# Patient Record
Sex: MALE | Race: BLACK OR AFRICAN AMERICAN | Employment: UNEMPLOYED | URBAN - METROPOLITAN AREA
[De-identification: names, ages, dates, MRNs, and addresses within clinical notes are randomized per-mention and may not be internally consistent; named-entity substitution may affect disease eponyms.]

---

## 2019-08-27 ENCOUNTER — HOSPITAL ENCOUNTER (OUTPATIENT)
Dept: LAB | Age: 59
Discharge: HOME | End: 2019-08-27
Payer: MEDICARE

## 2019-08-27 DIAGNOSIS — Z79.899 ENCOUNTER FOR LONG-TERM (CURRENT) USE OF OTHER MEDICATIONS: ICD-10-CM

## 2019-08-27 DIAGNOSIS — K30 DYSPEPSIA DUE TO DYSMOTILITY: ICD-10-CM

## 2019-08-27 DIAGNOSIS — R63.0 ANOREXIA: ICD-10-CM

## 2019-08-27 LAB
ALBUMIN SERPL-MCNC: 3.5 G/DL (ref 3.4–5)
ALBUMIN/GLOB SERPL: 0.9 {RATIO} (ref 0.8–1.7)
ALP SERPL-CCNC: 60 U/L (ref 45–117)
ALT SERPL-CCNC: 36 U/L (ref 16–61)
ANION GAP SERPL CALC-SCNC: 6 MMOL/L (ref 3–18)
AST SERPL-CCNC: 21 U/L (ref 10–38)
BILIRUB SERPL-MCNC: 0.5 MG/DL (ref 0.2–1)
BUN SERPL-MCNC: 11 MG/DL (ref 7–18)
BUN/CREAT SERPL: 11 (ref 12–20)
CALCIUM SERPL-MCNC: 8.8 MG/DL (ref 8.5–10.1)
CHLORIDE SERPL-SCNC: 107 MMOL/L (ref 100–111)
CO2 SERPL-SCNC: 29 MMOL/L (ref 21–32)
CREAT SERPL-MCNC: 1.01 MG/DL (ref 0.6–1.3)
ERYTHROCYTE [DISTWIDTH] IN BLOOD BY AUTOMATED COUNT: 13.8 % (ref 11.6–14.5)
GLOBULIN SER CALC-MCNC: 3.8 G/DL (ref 2–4)
GLUCOSE SERPL-MCNC: 83 MG/DL (ref 74–99)
HCT VFR BLD AUTO: 40.1 % (ref 36–48)
HGB BLD-MCNC: 14.4 G/DL (ref 13–16)
MCH RBC QN AUTO: 34.4 PG (ref 24–34)
MCHC RBC AUTO-ENTMCNC: 35.9 G/DL (ref 31–37)
MCV RBC AUTO: 95.7 FL (ref 74–97)
PLATELET # BLD AUTO: 263 K/UL (ref 135–420)
PMV BLD AUTO: 10.8 FL (ref 9.2–11.8)
POTASSIUM SERPL-SCNC: 4.4 MMOL/L (ref 3.5–5.5)
PROT SERPL-MCNC: 7.3 G/DL (ref 6.4–8.2)
RBC # BLD AUTO: 4.19 M/UL (ref 4.7–5.5)
SODIUM SERPL-SCNC: 142 MMOL/L (ref 136–145)
WBC # BLD AUTO: 6.1 K/UL (ref 4.6–13.2)

## 2019-08-27 PROCEDURE — 80053 COMPREHEN METABOLIC PANEL: CPT

## 2019-08-27 PROCEDURE — 85027 COMPLETE CBC AUTOMATED: CPT

## 2019-08-27 PROCEDURE — 36415 COLL VENOUS BLD VENIPUNCTURE: CPT

## 2020-03-04 ENCOUNTER — HOSPITAL ENCOUNTER (OUTPATIENT)
Dept: GENERAL RADIOLOGY | Age: 60
Discharge: HOME | End: 2020-03-04
Payer: MEDICARE

## 2020-03-04 DIAGNOSIS — Z11.1 SCREENING EXAMINATION FOR PULMONARY TUBERCULOSIS: ICD-10-CM

## 2020-03-04 PROCEDURE — 71046 X-RAY EXAM CHEST 2 VIEWS: CPT

## 2021-03-29 ENCOUNTER — HOSPITAL ENCOUNTER (EMERGENCY)
Age: 61
Discharge: HOME | End: 2021-03-29
Attending: EMERGENCY MEDICINE
Payer: MEDICARE

## 2021-03-29 VITALS
RESPIRATION RATE: 16 BRPM | HEART RATE: 108 BPM | TEMPERATURE: 98.2 F | SYSTOLIC BLOOD PRESSURE: 150 MMHG | OXYGEN SATURATION: 98 % | DIASTOLIC BLOOD PRESSURE: 105 MMHG

## 2021-03-29 DIAGNOSIS — R11.2 NON-INTRACTABLE VOMITING WITH NAUSEA, UNSPECIFIED VOMITING TYPE: Primary | ICD-10-CM

## 2021-03-29 DIAGNOSIS — R19.7 DIARRHEA, UNSPECIFIED TYPE: ICD-10-CM

## 2021-03-29 LAB
BASOPHILS # BLD: 0 K/UL (ref 0–0.1)
BASOPHILS NFR BLD: 0 % (ref 0–2)
DIFFERENTIAL METHOD BLD: ABNORMAL
EOSINOPHIL # BLD: 0.2 K/UL (ref 0–0.4)
EOSINOPHIL NFR BLD: 1 % (ref 0–5)
ERYTHROCYTE [DISTWIDTH] IN BLOOD BY AUTOMATED COUNT: 13.7 % (ref 11.6–14.5)
HCT VFR BLD AUTO: 41.4 % (ref 36–48)
HGB BLD-MCNC: 15.2 G/DL (ref 13–16)
LYMPHOCYTES # BLD: 2.2 K/UL (ref 0.9–3.6)
LYMPHOCYTES NFR BLD: 20 % (ref 21–52)
MCH RBC QN AUTO: 34.1 PG (ref 24–34)
MCHC RBC AUTO-ENTMCNC: 36.7 G/DL (ref 31–37)
MCV RBC AUTO: 92.8 FL (ref 74–97)
MONOCYTES # BLD: 0.9 K/UL (ref 0.05–1.2)
MONOCYTES NFR BLD: 8 % (ref 3–10)
NEUTS SEG # BLD: 7.7 K/UL (ref 1.8–8)
NEUTS SEG NFR BLD: 71 % (ref 40–73)
PLATELET # BLD AUTO: 413 K/UL (ref 135–420)
PMV BLD AUTO: 10.5 FL (ref 9.2–11.8)
RBC # BLD AUTO: 4.46 M/UL (ref 4.7–5.5)
WBC # BLD AUTO: 11 K/UL (ref 4.6–13.2)

## 2021-03-29 PROCEDURE — 85025 COMPLETE CBC W/AUTO DIFF WBC: CPT

## 2021-03-29 PROCEDURE — 99282 EMERGENCY DEPT VISIT SF MDM: CPT

## 2021-03-29 RX ORDER — ONDANSETRON 4 MG/1
4 TABLET, ORALLY DISINTEGRATING ORAL
Status: DISCONTINUED | OUTPATIENT
Start: 2021-03-29 | End: 2021-03-30 | Stop reason: HOSPADM

## 2021-03-29 RX ORDER — ONDANSETRON 4 MG/1
4 TABLET, ORALLY DISINTEGRATING ORAL
Qty: 20 TAB | Refills: 0 | Status: SHIPPED | OUTPATIENT
Start: 2021-03-29

## 2021-03-29 RX ORDER — ONDANSETRON 2 MG/ML
4 INJECTION INTRAMUSCULAR; INTRAVENOUS
Status: DISCONTINUED | OUTPATIENT
Start: 2021-03-29 | End: 2021-03-29

## 2021-03-29 NOTE — DISCHARGE INSTRUCTIONS
[employer] Activation    Thank you for requesting access to [employer] Please follow the instructions below to securely access and download your online medical record. [employer] allows you to send messages to your doctor, view your test results, renew your prescriptions, schedule appointments, and more. How Do I Sign Up? In your internet browser, go to [email or web address]  Click on the First Time User? Click Here link in the Sign In box. You will be redirect to the New Member Sign Up page. Enter your [employer] Access Code exactly as it appears below. You will not need to use this code after youve completed the sign-up process. If you do not sign up before the expiration date, you must request a new code. [employer] Access Code: [unfilled] (This is the date your [employer] access code will )    Enter the last four digits of your Social Security Number (xxxx) and Date of Birth (mm/dd/yyyy) as indicated and click Submit. You will be taken to the next sign-up page. Create a [employer] ID. This will be your [employer] login ID and cannot be changed, so think of one that is secure and easy to remember. Create a [employer] password. You can change your password at any time. Enter your Password Reset Question and Answer. This can be used at a later time if you forget your password. Enter your e-mail address. You will receive e-mail notification when new information is available in [address] Click Sign Up. You can now view and download portions of your medical record. Click the [place] link to download a portable copy of your medical information. Additional Information    If you have questions, please visit the Frequently Asked Questions section of the [employer] website at [email or web address] Remember, [employer] is NOT to be used for urgent needs. For medical emergencies, dial 911.

## 2021-03-29 NOTE — ED PROVIDER NOTES
EMERGENCY DEPARTMENT HISTORY AND PHYSICAL EXAM    Date: 3/29/2021  Patient Name: Raad Schneider III    History of Presenting Illness     Chief Complaint   Patient presents with    Other     dialysis          History Provided By: Patient    Chief Complaint: Nausea vomiting and diarrhea, loss of appetite  Duration: 5 days  Timing: Improved  Location: Abdomen  Quality: Cramping  Severity: Moderate  Modifying Factors: None  Associated Symptoms: none       Additional History (Context): Raad Schneider III is a 62 y.o. male with a history of schizophrenia who presents today for history as listed above. Patient is coming from [address] [facility] and his caregiver Turner Sigala ([phone]) provided the majority of the history. Per Ms. Turner Sigala last Thursday patient started having nausea vomiting and diarrhea. They were giving him Imodium at home and states this is helped however she was concerned that he will not eat a full meal.  States he has been eating and drinking some but not like normal.  Denies any exposure to Covid. States he has had his Covid vaccine. Patient and Ms. Turner Sigala denies any history of dialysis or hypertension. States he has been compliant with his lithium. PCP: Leno Williamson MD    Current Facility-Administered Medications   Medication Dose Route Frequency Provider Last Rate Last Admin    ondansetron [facility]) injection 4 mg  4 mg IntraVENous NOW Nohemi Gibson PA        sodium chloride 0.9 % bolus infusion 1,000 mL  1,000 mL IntraVENous ONCE Jasson Gibson [place]         Current Outpatient Medications   Medication Sig Dispense Refill    lithium carbonate (LITHOBID) 150 mg capsule Take  by mouth three (3) times daily. Past History     Past Medical History:  Past Medical History:   Diagnosis Date    Psychiatric disorder     Schizophrenia        Past Surgical History:  No past surgical history on file. Family History:  No family history on file.     Social History:  Social History     Tobacco Use    Smoking status: Current Every Day Smoker     Packs/day: 0.50   Substance Use Topics    Alcohol use: Yes     Comment: Daily     Drug use: Not on file       Allergies: Allergies   Allergen Reactions    Other Food Swelling     Peanuts    Pcn [Penicillins] Unknown (comments)         Review of Systems   Review of Systems   Constitutional: Negative for chills and fever. HENT: Negative for congestion, rhinorrhea and sore throat. Respiratory: Negative for cough and shortness of breath. Cardiovascular: Negative for chest pain. Gastrointestinal: Positive for abdominal pain, diarrhea, nausea and vomiting. Negative for blood in stool and constipation. Genitourinary: Negative for dysuria, frequency and hematuria. Musculoskeletal: Negative for back pain and myalgias. Skin: Negative for rash and wound. Neurological: Negative for dizziness and headaches. All other systems reviewed and are negative. All Other Systems Negative  Physical Exam     Vitals:    03/29/21 1632   BP: (!) 203/54   Pulse: (!) 45   Resp: 20   Temp: 98.1 °F (36.7 °C)   SpO2: 100%   Height: 5' 4\" (1.626 m)     Physical Exam  Vitals signs and nursing note reviewed. Constitutional:       General: He is not in acute distress. Appearance: He is well-developed. He is not diaphoretic. HENT:      Head: Normocephalic and atraumatic. Eyes:      Conjunctiva/sclera: Conjunctivae normal.   Neck:      Musculoskeletal: Normal range of motion and neck supple. Cardiovascular:      Rate and Rhythm: Normal rate and regular rhythm. Heart sounds: Normal heart sounds. Pulmonary:      Effort: Pulmonary effort is normal. No respiratory distress. Breath sounds: Normal breath sounds. Chest:      Chest wall: No tenderness. Abdominal:      General: Bowel sounds are normal. There is no distension. Palpations: Abdomen is soft. Tenderness: There is no abdominal tenderness.  There is no guarding or rebound. Comments: Soft and non tender    Musculoskeletal: Normal range of motion. General: No deformity. Skin:     General: Skin is warm and dry. Neurological:      Mental Status: He is alert and oriented to person, place, and time. Diagnostic Study Results     Labs -   No results found for this or any previous visit (from the past 12 hour(s)). Radiologic Studies -   No orders to display     CT Results  (Last 48 hours)    None        CXR Results  (Last 48 hours)    None            Medical Decision Making   I am the first provider for this patient. I reviewed the vital signs, available nursing notes, past medical history, past surgical history, family history and social history. Vital Signs-Reviewed the patient's vital signs. Records Reviewed: Nursing Notes and Old Medical Records     Procedures: None   Procedures    Provider Notes (Medical Decision Making):     Differential: Gastroenteritis, colitis, Covid, IBS/IBD      Plan: Order basic labs, fluids and Zofran. We will also test for Covid. 5:55 PM : Pt care transferred to April PA,ED provider. History of patient complaint(s), available diagnostic reports and current treatment plan has been discussed thoroughly. Bedside rounding on patient occured : no . Intended disposition of patient : Home   Pending diagnostics reports and/or labs (please list): Labs and fluids        MED RECONCILIATION:  Current Facility-Administered Medications   Medication Dose Route Frequency    ondansetron (ZOFRAN) injection 4 mg  4 mg IntraVENous NOW    sodium chloride 0.9 % bolus infusion 1,000 mL  1,000 mL IntraVENous ONCE     Current Outpatient Medications   Medication Sig    lithium carbonate (LITHOBID) 150 mg capsule Take  by mouth three (3) times daily. Disposition:  TBD    Diagnosis     Clinical Impression: No diagnosis found.       \"Please note that this dictation was completed with [employer], the [employer] voice recognition software. Quite often unanticipated grammatical, syntax, homophones, and other interpretive errors are inadvertently transcribed by the computer software. Please disregard these errors. Please excuse any errors that have escaped final proofreading. \"

## 2021-03-29 NOTE — ED NOTES
6:00 PM Assumed care of the pt at this time. Discussed with PILY Sanford concerning patient Pippa Marin III, standard discussion of reason for visit, HPI, ROS, PE, and current results available. Recommendation for obtaining pending labs and bedside re-evaluation. Miley Bravo PA-C     6:46 PM CBC essentially unremarkable. Remaining pending labs hemolyzed. Miley Bravo PA-C      7:27 PM I have seen and re-evaluated the pt at bedside. Hemolyzed labs redrawn and in process. Pt is requesting Maalox and to be discharged home. ODT zofran and maalox ordered. He has no pain or abd TTP noted on exam. Initial vitals and triage note were removed from the chart as the nurse placed them in the wrong chart. Repeat vitals stable. Will plan to d/c provided that the pending labs are unremarkable. Miley Bravo PA-C     7:48 PM recollect hemolyzed again. Will straight stick the pt. Miley Bravo PA-C     7:51 PM pt refused recollect. He has no pain at present. Vitals stable. No concerns for sepsis or acute abdomen at this time. Will plan to kinder discharge the pt with close PCP follow-up this week. Return precautions given. Pt agrees . Miley Bravo PA-C       Disposition: d/c home

## 2022-09-08 ENCOUNTER — HOSPITAL ENCOUNTER (OUTPATIENT)
Dept: RADIATION THERAPY | Age: 62
Discharge: HOME | End: 2022-09-08
Payer: MEDICARE

## 2022-09-08 PROCEDURE — 99211 OFF/OP EST MAY X REQ PHY/QHP: CPT

## 2022-09-08 PROCEDURE — 99205 OFFICE O/P NEW HI 60 MIN: CPT | Performed by: RADIOLOGY

## 2022-12-20 ENCOUNTER — APPOINTMENT (OUTPATIENT)
Dept: CT IMAGING | Age: 62
DRG: 390 | End: 2022-12-20
Attending: EMERGENCY MEDICINE
Payer: MEDICARE

## 2022-12-20 ENCOUNTER — HOSPITAL ENCOUNTER (INPATIENT)
Age: 62
LOS: 5 days | Discharge: HOME | DRG: 390 | End: 2022-12-25
Attending: STUDENT IN AN ORGANIZED HEALTH CARE EDUCATION/TRAINING PROGRAM | Admitting: HOSPITALIST
Payer: MEDICARE

## 2022-12-20 DIAGNOSIS — E87.1 HYPONATREMIA: ICD-10-CM

## 2022-12-20 DIAGNOSIS — R74.8 ELEVATED LIPASE: ICD-10-CM

## 2022-12-20 DIAGNOSIS — K56.609 SMALL BOWEL OBSTRUCTION (HCC): ICD-10-CM

## 2022-12-20 DIAGNOSIS — R10.32 ABDOMINAL PAIN, LLQ (LEFT LOWER QUADRANT): Primary | ICD-10-CM

## 2022-12-20 DIAGNOSIS — F20.9 SCHIZOPHRENIA, UNSPECIFIED TYPE (HCC): ICD-10-CM

## 2022-12-20 LAB
ALBUMIN SERPL-MCNC: 3.5 G/DL (ref 3.4–5)
ALBUMIN/GLOB SERPL: 0.7 {RATIO} (ref 0.8–1.7)
ALP SERPL-CCNC: 72 U/L (ref 45–117)
ALT SERPL-CCNC: 23 U/L (ref 16–61)
ANION GAP SERPL CALC-SCNC: 6 MMOL/L (ref 3–18)
APPEARANCE UR: CLEAR
AST SERPL-CCNC: 9 U/L (ref 10–38)
BASOPHILS # BLD: 0 K/UL (ref 0–0.1)
BASOPHILS NFR BLD: 0 % (ref 0–2)
BILIRUB SERPL-MCNC: 0.8 MG/DL (ref 0.2–1)
BILIRUB UR QL: NEGATIVE
BUN SERPL-MCNC: 12 MG/DL (ref 7–18)
BUN/CREAT SERPL: 9 (ref 12–20)
CALCIUM SERPL-MCNC: 9.2 MG/DL (ref 8.5–10.1)
CHLORIDE SERPL-SCNC: 93 MMOL/L (ref 100–111)
CO2 SERPL-SCNC: 31 MMOL/L (ref 21–32)
COLOR UR: ABNORMAL
CREAT SERPL-MCNC: 1.27 MG/DL (ref 0.6–1.3)
DIFFERENTIAL METHOD BLD: ABNORMAL
EOSINOPHIL # BLD: 0.1 K/UL (ref 0–0.4)
EOSINOPHIL NFR BLD: 1 % (ref 0–5)
ERYTHROCYTE [DISTWIDTH] IN BLOOD BY AUTOMATED COUNT: 13.6 % (ref 11.6–14.5)
GLOBULIN SER CALC-MCNC: 5.1 G/DL (ref 2–4)
GLUCOSE SERPL-MCNC: 111 MG/DL (ref 74–99)
GLUCOSE UR STRIP.AUTO-MCNC: NEGATIVE MG/DL
HCT VFR BLD AUTO: 42.9 % (ref 36–48)
HGB BLD-MCNC: 15 G/DL (ref 13–16)
HGB UR QL STRIP: NEGATIVE
IMM GRANULOCYTES # BLD AUTO: 0 K/UL (ref 0–0.04)
IMM GRANULOCYTES NFR BLD AUTO: 0 % (ref 0–0.5)
KETONES UR QL STRIP.AUTO: ABNORMAL MG/DL
LEUKOCYTE ESTERASE UR QL STRIP.AUTO: NEGATIVE
LIPASE SERPL-CCNC: 51 U/L (ref 73–393)
LYMPHOCYTES # BLD: 1.8 K/UL (ref 0.9–3.6)
LYMPHOCYTES NFR BLD: 19 % (ref 21–52)
MCH RBC QN AUTO: 33 PG (ref 24–34)
MCHC RBC AUTO-ENTMCNC: 35 G/DL (ref 31–37)
MCV RBC AUTO: 94.3 FL (ref 78–100)
MONOCYTES # BLD: 1.1 K/UL (ref 0.05–1.2)
MONOCYTES NFR BLD: 12 % (ref 3–10)
NEUTS SEG # BLD: 6 K/UL (ref 1.8–8)
NEUTS SEG NFR BLD: 67 % (ref 40–73)
NITRITE UR QL STRIP.AUTO: NEGATIVE
NRBC # BLD: 0 K/UL (ref 0–0.01)
NRBC BLD-RTO: 0 PER 100 WBC
PH UR STRIP: 6.5 [PH] (ref 5–8)
PLATELET # BLD AUTO: 296 K/UL (ref 135–420)
PMV BLD AUTO: 10.6 FL (ref 9.2–11.8)
POTASSIUM SERPL-SCNC: 4.2 MMOL/L (ref 3.5–5.5)
PROT SERPL-MCNC: 8.6 G/DL (ref 6.4–8.2)
PROT UR STRIP-MCNC: NEGATIVE MG/DL
RBC # BLD AUTO: 4.55 M/UL (ref 4.35–5.65)
SODIUM SERPL-SCNC: 130 MMOL/L (ref 136–145)
SP GR UR REFRACTOMETRY: 1.01 (ref 1–1.03)
UROBILINOGEN UR QL STRIP.AUTO: 1 EU/DL (ref 0.2–1)
WBC # BLD AUTO: 9 K/UL (ref 4.6–13.2)

## 2022-12-20 PROCEDURE — 0D9670Z DRAINAGE OF STOMACH WITH DRAINAGE DEVICE, VIA NATURAL OR ARTIFICIAL OPENING: ICD-10-PCS | Performed by: GENERAL PRACTICE

## 2022-12-20 PROCEDURE — 74011000636 HC RX REV CODE- 636: Performed by: STUDENT IN AN ORGANIZED HEALTH CARE EDUCATION/TRAINING PROGRAM

## 2022-12-20 PROCEDURE — 65270000029 HC RM PRIVATE

## 2022-12-20 PROCEDURE — 96374 THER/PROPH/DIAG INJ IV PUSH: CPT

## 2022-12-20 PROCEDURE — 81003 URINALYSIS AUTO W/O SCOPE: CPT

## 2022-12-20 PROCEDURE — 83690 ASSAY OF LIPASE: CPT

## 2022-12-20 PROCEDURE — 99285 EMERGENCY DEPT VISIT HI MDM: CPT

## 2022-12-20 PROCEDURE — 80053 COMPREHEN METABOLIC PANEL: CPT

## 2022-12-20 PROCEDURE — 74011250636 HC RX REV CODE- 250/636: Performed by: GENERAL PRACTICE

## 2022-12-20 PROCEDURE — 85025 COMPLETE CBC W/AUTO DIFF WBC: CPT

## 2022-12-20 PROCEDURE — 74177 CT ABD & PELVIS W/CONTRAST: CPT

## 2022-12-20 RX ORDER — MORPHINE SULFATE 4 MG/ML
4 INJECTION INTRAVENOUS ONCE
Status: COMPLETED | OUTPATIENT
Start: 2022-12-20 | End: 2022-12-20

## 2022-12-20 RX ADMIN — MORPHINE SULFATE 4 MG: 4 INJECTION, SOLUTION INTRAMUSCULAR; INTRAVENOUS at 22:24

## 2022-12-20 RX ADMIN — IOPAMIDOL 100 ML: 612 INJECTION, SOLUTION INTRAVENOUS at 19:19

## 2022-12-20 RX ADMIN — SODIUM CHLORIDE 1000 ML: 9 INJECTION, SOLUTION INTRAVENOUS at 22:26

## 2022-12-20 NOTE — ED PROVIDER NOTES
[facility]  Emergency Department Treatment Report    Patient: Lincoln Taylor III Age: 65 y.o. Sex: male    YOB: 1960 Admit Date: 12/20/2022 PCP: Solomon Painter MD   MRN: [id number]  CSN: [id number]     Room: [facility] Time Dictated: 4:07 PM      Chief Complaint   Chief Complaint   Patient presents with    Abdominal Pain     History of Present Illness   65 y.o. male who presents with concerns for abdominal pain in the left lower quadrant and has been going on for 3 days. He denies any fevers or chills or shortness of breath or chest pain. Denies any diarrhea. Denies any difficulty with urination or burning or pain. States he has never had any surgery of his belly. He denies any recent weight loss. Per nursing report:  Patient lives in assisted housing, they called mother to bring him in for abdominal pain that has been going on for 3 days. Patient denies N/V, tender during palpation per patient. Endorses diarrhea     Review of Systems   Review of Systems   Constitutional:  Negative for chills, diaphoresis, fever and malaise/fatigue. HENT:  Negative for congestion, sore throat and tinnitus. Eyes:  Negative for redness. Respiratory:  Negative for cough, hemoptysis, shortness of breath and wheezing. Cardiovascular:  Negative for chest pain, palpitations and leg swelling. Gastrointestinal:  Positive for abdominal pain and diarrhea. Negative for blood in stool, nausea and vomiting. Genitourinary:  Negative for dysuria. Musculoskeletal:  Negative for falls. Skin:  Negative for rash. Neurological:  Negative for dizziness, focal weakness, weakness and headaches.      Past Medical/Surgical History     Past Medical History:   Diagnosis Date    Psychiatric disorder     Schizophrenia      Past Surgical History:   Procedure Laterality Date    HX UROLOGICAL  06/02/2022     TRANSRECTAL ULTRASOUND GUIDED BIOPSY OF THE PROSTATE; Dr. Julio Gutiérrez History     Socioeconomic History    Marital status: SINGLE     Spouse name: Not on file    Number of children: Not on file    Years of education: Not on file    Highest education level: Not on file   Occupational History    Not on file   Tobacco Use    Smoking status: Every Day     Packs/day: 0.50     Types: Cigarettes    Smokeless tobacco: Never   Substance and Sexual Activity    Alcohol use: Yes     Comment: Daily     Drug use: Not on file    Sexual activity: Not on file   Other Topics Concern    Not on file   Social History Narrative    Not on file     Social Determinants of Health     Financial Resource Strain: Not on file   Food Insecurity: Not on file   Transportation Needs: Not on file   Physical Activity: Not on file   Stress: Not on file   Social Connections: Not on file   Intimate Partner Violence: Not on file   Housing Stability: Not on file     Family History   No family history on file. Current Medications     Prior to Admission Medications   Prescriptions Last Dose Informant Patient Reported? Taking? OLANZapine (ZyPREXA) 20 mg tablet   Yes No   Sig: Take 20 mg by mouth nightly. QUEtiapine (SEROquel) 100 mg tablet   Yes No   Therems-M 9 mg iron-400 mcg tab tablet   Yes No   cyproheptadine (PERIACTIN) 4 mg tablet   Yes No   dicyclomine (BENTYL) 10 mg capsule   Yes No   divalproex DR (DEPAKOTE) 500 mg tablet   Yes No   docusate sodium (COLACE) 100 mg capsule   Yes No   Sig: Take 100 mg by mouth two (2) times a day.   hydrOXYzine HCL (ATARAX) 50 mg tablet   Yes No   Sig: Take 50 mg by mouth three (3) times daily as needed. levoFLOXacin (LEVAQUIN) 750 mg tablet   No No   Sig: Take 1 Tablet by mouth daily. Take morning of the biopsy   lithium carbonate (LITHOBID) 150 mg capsule   Yes No   Sig: Take  by mouth three (3) times daily. omeprazole (PRILOSEC) 40 mg capsule   Yes No   Sig: Take 40 mg by mouth daily.    ondansetron (Zofran ODT) 4 mg disintegrating tablet   No No   Si Tab by SubLINGual route every eight (8) hours as needed for Nausea or Vomiting.   tamsulosin (FLOMAX) 0.4 mg capsule   No No   Sig: Take 1 Capsule by mouth daily (after dinner). terbinafine HCL (LAMISIL) 1 % topical cream   Yes No   Sig: Apply  to affected area two (2) times a day. traZODone (DESYREL) 150 mg tablet   Yes No   Sig: Take 150 mg by mouth nightly. Facility-Administered Medications: None     Allergies     Allergies   Allergen Reactions    Other Food Swelling     Peanuts    Peanuts [Peanut] Anaphylaxis    Pcn [Penicillins] Unknown (comments)     Physical Exam     ED Triage Vitals [12/20/22 1559]   ED Encounter Vitals Group      BP (!) 150/122      Pulse (Heart Rate) (!) 120      Resp Rate 16      Temp 97.9 °F (36.6 °C)      Temp src       O2 Sat (%) 98 %      Weight       Height       Physical Exam  Vitals and nursing note reviewed. Constitutional:       General: He is not in acute distress. Appearance: He is normal weight. He is not ill-appearing, toxic-appearing or diaphoretic. HENT:      Head: Normocephalic and atraumatic. Mouth/Throat:      Mouth: Mucous membranes are moist.      Pharynx: Oropharynx is clear. Eyes:      General: No visual field deficit. Extraocular Movements: Extraocular movements intact. Right eye: Normal extraocular motion and no nystagmus. Left eye: Normal extraocular motion and no nystagmus. Pupils: Pupils are equal, round, and reactive to light. Cardiovascular:      Rate and Rhythm: Normal rate and regular rhythm. Heart sounds: No murmur heard. No friction rub. No gallop. Pulmonary:      Effort: Pulmonary effort is normal. No respiratory distress. Breath sounds: Normal breath sounds. No stridor. Abdominal:      General: Bowel sounds are normal.      Palpations: Abdomen is soft. Tenderness: There is abdominal tenderness in the left lower quadrant. There is no right CVA tenderness, left CVA tenderness or guarding.       Hernia: No hernia is present. Musculoskeletal:      Cervical back: Normal range of motion and neck supple. Skin:     General: Skin is warm and dry. Capillary Refill: Capillary refill takes less than 2 seconds. Coloration: Skin is not pale. Neurological:      Mental Status: He is alert and oriented to person, place, and time. GCS: GCS eye subscore is 4. GCS verbal subscore is 5. GCS motor subscore is 6. Cranial Nerves: No cranial nerve deficit, dysarthria or facial asymmetry. Sensory: No sensory deficit. Motor: No weakness. Psychiatric:         Speech: Speech normal.      Diagnostic Studies   Lab:   Recent Results (from the past 12 hour(s))   URINALYSIS W/ RFLX MICROSCOPIC    Collection Time: 12/20/22  4:01 PM   Result Value Ref Range    Color DARK YELLOW      Appearance CLEAR      Specific gravity 1.013 1.005 - 1.030      pH (UA) 6.5 5.0 - 8.0      Protein Negative NEG mg/dL    Glucose Negative NEG mg/dL    Ketone TRACE (A) NEG mg/dL    Bilirubin Negative NEG      Blood Negative NEG      Urobilinogen 1.0 0.2 - 1.0 EU/dL    Nitrites Negative NEG      Leukocyte Esterase Negative NEG     CBC WITH AUTOMATED DIFF    Collection Time: 12/20/22  4:45 PM   Result Value Ref Range    WBC 9.0 4.6 - 13.2 K/uL    RBC 4.55 4.35 - 5.65 M/uL    HGB 15.0 13.0 - 16.0 g/dL    HCT 42.9 36.0 - 48.0 %    MCV 94.3 78.0 - 100.0 FL    MCH 33.0 24.0 - 34.0 PG    MCHC 35.0 31.0 - 37.0 g/dL    RDW 13.6 11.6 - 14.5 %    PLATELET 093 799 - 982 K/uL    MPV 10.6 9.2 - 11.8 FL    NRBC 0.0 0  WBC    ABSOLUTE NRBC 0.00 0.00 - 0.01 K/uL    NEUTROPHILS 67 40 - 73 %    LYMPHOCYTES 19 (L) 21 - 52 %    MONOCYTES 12 (H) 3 - 10 %    EOSINOPHILS 1 0 - 5 %    BASOPHILS 0 0 - 2 %    IMMATURE GRANULOCYTES 0 0.0 - 0.5 %    ABS. NEUTROPHILS 6.0 1.8 - 8.0 K/UL    ABS. LYMPHOCYTES 1.8 0.9 - 3.6 K/UL    ABS. MONOCYTES 1.1 0.05 - 1.2 K/UL    ABS. EOSINOPHILS 0.1 0.0 - 0.4 K/UL    ABS. BASOPHILS 0.0 0.0 - 0.1 K/UL    ABS. IMM. GRANS. 0.0 0.00 - 0.04 K/UL    DF AUTOMATED     LIPASE    Collection Time: 12/20/22  4:45 PM   Result Value Ref Range    Lipase 51 (L) 73 - 954 U/L   METABOLIC PANEL, COMPREHENSIVE    Collection Time: 12/20/22  4:45 PM   Result Value Ref Range    Sodium 130 (L) 136 - 145 mmol/L    Potassium 4.2 3.5 - 5.5 mmol/L    Chloride 93 (L) 100 - 111 mmol/L    CO2 31 21 - 32 mmol/L    Anion gap 6 3.0 - 18 mmol/L    Glucose 111 (H) 74 - 99 mg/dL    BUN 12 7.0 - 18 MG/DL    Creatinine 1.27 0.6 - 1.3 MG/DL    BUN/Creatinine ratio 9 (L) 12 - 20      eGFR >60 >60 ml/min/1.73m2    Calcium 9.2 8.5 - 10.1 MG/DL    Bilirubin, total 0.8 0.2 - 1.0 MG/DL    ALT (SGPT) 23 16 - 61 U/L    AST (SGOT) 9 (L) 10 - 38 U/L    Alk. phosphatase 72 45 - 117 U/L    Protein, total 8.6 (H) 6.4 - 8.2 g/dL    Albumin 3.5 3.4 - 5.0 g/dL    Globulin 5.1 (H) 2.0 - 4.0 g/dL    A-G Ratio 0.7 (L) 0.8 - 1.7       Labs Reviewed   URINALYSIS W/ RFLX MICROSCOPIC - Abnormal; Notable for the following components:       Result Value    Ketone TRACE (*)     All other components within normal limits   CBC WITH AUTOMATED DIFF - Abnormal; Notable for the following components:    LYMPHOCYTES 19 (*)     MONOCYTES 12 (*)     All other components within normal limits   LIPASE - Abnormal; Notable for the following components:    Lipase 51 (*)     All other components within normal limits   METABOLIC PANEL, COMPREHENSIVE - Abnormal; Notable for the following components:    Sodium 130 (*)     Chloride 93 (*)     Glucose 111 (*)     BUN/Creatinine ratio 9 (*)     AST (SGOT) 9 (*)     Protein, total 8.6 (*)     Globulin 5.1 (*)     A-G Ratio 0.7 (*)     All other components within normal limits     Imaging:    CT ABD PELV W CONT    Result Date: 12/20/2022  CT of the Abdomen and Pelvis With Contrast CLINICAL HISTORY: Abdominal pain and diarrhea. . TECHNIQUE: After administration of oral and nonionic intravenous contrast, 5 mm MDCT was obtained of the abdomen and pelvis.  Sagittal and coronal reformations then created with the original data set. All CT scans at this facility are performed using dose optimization techniques as appropriate to a performed exam, to include automated exposure control, adjustment of the mA and/or kV according to patient's size (including appropriate matching for site specific examinations), or use of iterative reconstruction technique. COMPARISON: No priors  FINDINGS: CT Abdomen and pelvis:  view:   Dilated small bowel loops. Paucity of gas distally. Lung bases: Clear. No effusions. Heart and pericardium: Normal. Liver:  Normal. There is a cyst near the dome posteriorly measuring 13 mm. Gallbladder/biliary: No calcified stones or wall thickening. Spleen: Normal. Pancreas: Normal enhancement. No duct dilatation. Adrenals: Normal. Kidneys:  Normally enhancing. No hydronephrosis or nephrolithiasis. Retroperitoneum: Great vessels unremarkable. Lymph nodes: No adenopathy upper abdomen, mesentery, retroperitoneum, pelvis, groins. Bowel: Stomach and duodenum are unremarkable. Proximal small bowel is collapsed. Mid to distal small bowel loops are dilated measuring up to 4.5 cm. No wall thickening or pneumatosis. No mesenteric gas. Abrupt change in caliber of the distal ileum in the right lower quadrant. Discrete cause is not definitively seen. Appendix normal. The colon is nearly completely empty, containing small quantities of stool and gas. Peritoneal space: Trace free fluid anteriorly in the low abdomen :  Prostate and urinary bladder are unremarkable. Abdominal wall/MSK: No hernias. No acute abnormalities at skeleton. High-grade small bowel obstruction with transition at right lower quadrant. Cause is not clearly seen. -Trace free fluid. ED Course/MDM     ED Course as of 12/20/22 2330   Tue Dec 20, 2022   1716 Sodium(!): 130 [CT]   1728 Lipase(!): 51 [CT]   2213 CT abdomen pelvis shows a high-grade small bowel obstruction.   We will need to move the patient back to a main ER bed. Will order fluids and pain control. Patient will need a general surgery consult. [CT]   2213    IMPRESSION     High-grade small bowel obstruction with transition at right lower quadrant. Cause is not clearly seen. -Trace free fluid. [CT]   2217 Trying to get the patient a bed in the ER main side so I can consult general surgery. [CT]   2219 General surgery consult placed [CT]   2229 Spoke to general surgery who recommended nasogastric tube placement and hospitalist admission. [CT]      ED Course User Index  [CT] Von Ghotra MD     Medical Decision Makin-year-old male with a history of a transurethral resection of the prostate back in 2022 but no other abdominal surgeries presents with concerns for left lower quadrant abdominal pain that started approximately 3 days ago. He states he has had some diarrhea that is nonbloody as well. On physical examination patient has tenderness with no rebound over the left lower quadrant. Laboratory evaluation is essentially unremarkable except for a lipase that slightly elevated at 51 and a sodium is slightly low at 130. CT of the abdomen and pelvis shows a high-grade small bowel obstruction in the right lower quadrant. Patient given IV fluids and made n.p.o.  4 mg of morphine were ordered and general surgery was consulted as patient will likely need admission. They recommended an NG tube placement and admission to the hospital and they will see him tomorrow. Internal medicine consulted as well who will accept admission to the hospital and placed on surgical floor. Differential Diagnosis includes but is not limited to: Diverticulitis, colitis, small bowel obstruction, renal calculi, urinary tract infection, mesenteric ischemia. Procedures    Final Diagnosis       ICD-10-CM ICD-9-CM   1. Abdominal pain, LLQ (left lower quadrant)  R10.32 789.04   2. Small bowel obstruction ([facility] [address]  K56.609 560.9   3.  Hyponatremia  E87.1 276.1   4. Elevated lipase  R74.8 790.5     Disposition   Admit    The patient was personally evaluated by myself and Dr. Mortimer Register who agrees with the above assessment and plan. [address]  Emergency Medicine Residency  PGY-4  December 20, 2022  4:07 PM    My signature above authenticates this document and my orders, the final    diagnosis (es), discharge prescription (s), and instructions in the Epic    record. If you have any questions please contact [phone]. Nursing notes have been reviewed by the physician    [employer] medical dictation software was used for portions of this report. Unintended voice recognition errors may occur.

## 2022-12-20 NOTE — ED TRIAGE NOTES
Patient lives in assisted housing, they called mother to bring him in for abdominal pain that has been going on for 3 days. Patient denies N/V, tender during palpation per patient.      Endorses diarrhea

## 2022-12-20 NOTE — Clinical Note
Status[de-identified] INPATIENT [101]   Type of Bed: Surgical [18]   Cardiac Monitoring Required?: No   Inpatient Hospitalization Certified Necessary for the Following Reasons: 3.  Patient receiving treatment that can only be provided in an inpatient setting (further clarification in H&P documentation)   Admitting Diagnosis: Small bowel obstruction [facility]) [986240]   Admitting Physician: Demian Araiza [[id number]]   Attending Physician: Demian Araiza [[id number]]   Estimated Length of Stay: 3-4 Midnights   Discharge Plan[de-identified] Home with Office Follow-up

## 2022-12-21 ENCOUNTER — APPOINTMENT (OUTPATIENT)
Dept: GENERAL RADIOLOGY | Age: 62
DRG: 390 | End: 2022-12-21
Attending: HOSPITALIST
Payer: MEDICARE

## 2022-12-21 PROCEDURE — 74011250636 HC RX REV CODE- 250/636: Performed by: HOSPITALIST

## 2022-12-21 PROCEDURE — 65270000029 HC RM PRIVATE

## 2022-12-21 PROCEDURE — 99222 1ST HOSP IP/OBS MODERATE 55: CPT | Performed by: COLON & RECTAL SURGERY

## 2022-12-21 PROCEDURE — 99223 1ST HOSP IP/OBS HIGH 75: CPT | Performed by: HOSPITALIST

## 2022-12-21 PROCEDURE — 74011000250 HC RX REV CODE- 250: Performed by: HOSPITALIST

## 2022-12-21 PROCEDURE — 71045 X-RAY EXAM CHEST 1 VIEW: CPT

## 2022-12-21 RX ORDER — SODIUM CHLORIDE 0.9 % (FLUSH) 0.9 %
5-40 SYRINGE (ML) INJECTION EVERY 8 HOURS
Status: DISCONTINUED | OUTPATIENT
Start: 2022-12-21 | End: 2022-12-25 | Stop reason: HOSPADM

## 2022-12-21 RX ORDER — OLANZAPINE 20 MG/1
20 TABLET ORAL
Status: DISCONTINUED | OUTPATIENT
Start: 2022-12-21 | End: 2022-12-21 | Stop reason: CLARIF

## 2022-12-21 RX ORDER — SODIUM CHLORIDE 0.9 % (FLUSH) 0.9 %
5-40 SYRINGE (ML) INJECTION AS NEEDED
Status: DISCONTINUED | OUTPATIENT
Start: 2022-12-21 | End: 2022-12-25 | Stop reason: HOSPADM

## 2022-12-21 RX ORDER — LITHIUM CARBONATE 150 MG/1
150 CAPSULE ORAL 3 TIMES DAILY
Status: DISCONTINUED | OUTPATIENT
Start: 2022-12-21 | End: 2022-12-25 | Stop reason: HOSPADM

## 2022-12-21 RX ORDER — ACETAMINOPHEN 650 MG/1
650 SUPPOSITORY RECTAL
Status: DISCONTINUED | OUTPATIENT
Start: 2022-12-21 | End: 2022-12-25 | Stop reason: HOSPADM

## 2022-12-21 RX ORDER — ACETAMINOPHEN 325 MG/1
650 TABLET ORAL
Status: DISCONTINUED | OUTPATIENT
Start: 2022-12-21 | End: 2022-12-25 | Stop reason: HOSPADM

## 2022-12-21 RX ORDER — QUETIAPINE FUMARATE 100 MG/1
100 TABLET, FILM COATED ORAL
Status: DISCONTINUED | OUTPATIENT
Start: 2022-12-21 | End: 2022-12-25 | Stop reason: HOSPADM

## 2022-12-21 RX ORDER — SODIUM CHLORIDE 9 MG/ML
100 INJECTION, SOLUTION INTRAVENOUS CONTINUOUS
Status: DISCONTINUED | OUTPATIENT
Start: 2022-12-21 | End: 2022-12-25 | Stop reason: HOSPADM

## 2022-12-21 RX ORDER — TRAZODONE HYDROCHLORIDE 50 MG/1
150 TABLET ORAL
Status: DISCONTINUED | OUTPATIENT
Start: 2022-12-21 | End: 2022-12-25 | Stop reason: HOSPADM

## 2022-12-21 RX ORDER — ENOXAPARIN SODIUM 100 MG/ML
40 INJECTION SUBCUTANEOUS DAILY
Status: DISCONTINUED | OUTPATIENT
Start: 2022-12-21 | End: 2022-12-25 | Stop reason: HOSPADM

## 2022-12-21 RX ORDER — TAMSULOSIN HYDROCHLORIDE 0.4 MG/1
0.4 CAPSULE ORAL
Status: DISCONTINUED | OUTPATIENT
Start: 2022-12-21 | End: 2022-12-25 | Stop reason: HOSPADM

## 2022-12-21 RX ORDER — ONDANSETRON 2 MG/ML
4 INJECTION INTRAMUSCULAR; INTRAVENOUS
Status: DISCONTINUED | OUTPATIENT
Start: 2022-12-21 | End: 2022-12-25 | Stop reason: HOSPADM

## 2022-12-21 RX ORDER — HYDROXYZINE 50 MG/1
50 TABLET, FILM COATED ORAL
Status: DISCONTINUED | OUTPATIENT
Start: 2022-12-21 | End: 2022-12-25 | Stop reason: HOSPADM

## 2022-12-21 RX ORDER — OLANZAPINE 20 MG/1
20 TABLET, ORALLY DISINTEGRATING ORAL
Status: DISCONTINUED | OUTPATIENT
Start: 2022-12-21 | End: 2022-12-25 | Stop reason: HOSPADM

## 2022-12-21 RX ADMIN — SODIUM CHLORIDE 100 ML/HR: 9 INJECTION, SOLUTION INTRAVENOUS at 06:00

## 2022-12-21 RX ADMIN — ENOXAPARIN SODIUM 40 MG: 100 INJECTION SUBCUTANEOUS at 09:50

## 2022-12-21 RX ADMIN — SODIUM CHLORIDE, PRESERVATIVE FREE 10 ML: 5 INJECTION INTRAVENOUS at 13:35

## 2022-12-21 RX ADMIN — SODIUM CHLORIDE 100 ML/HR: 9 INJECTION, SOLUTION INTRAVENOUS at 16:27

## 2022-12-21 RX ADMIN — SODIUM CHLORIDE, PRESERVATIVE FREE 10 ML: 5 INJECTION INTRAVENOUS at 22:00

## 2022-12-21 RX ADMIN — SODIUM CHLORIDE, PRESERVATIVE FREE 10 ML: 5 INJECTION INTRAVENOUS at 06:05

## 2022-12-21 NOTE — ED NOTES
Patient out of bed, sitting in chair. He stated he wanted all monitoring equipment off of him. Current has tele monitor and bp monitor, he has removed O2 sat probe.

## 2022-12-21 NOTE — H&P
History & Physical    Patient: Scooby Wasserman MRN: [id number]  CSN: [id number]    YOB: 1960  Age: 65 y.o. Sex: male      DOA: 12/20/2022    Chief Complaint   Patient presents with    Abdominal Pain         Dragon medical dictation software was used for portions of this report. Unintended errors may occur. If you have any questions regarding the note or its content please set up a time to discuss by calling the nurse on the floor. Assessment/Plan     High-grade small bowel obstruction: With a transition point in the right lower quadrant. Patient is kept NPO. IV pain medications. IV nausea medications. NG tube to be placed for recommendations from general surgery. Schizophrenia: We will continue Seroquel at bedtime, Atarax as needed, Zyprexa at bedtime, lithium 150 mg 3 times daily, patient will definitely benefit from having been seen by a psychiatrist.    GERD: We will place on IV Protonix daily. BPH: Patient had a TURP procedure done in summer. We will continue Flomax 0.4 mg at bedtime. DVT prophylaxis: Heparin subcutaneous. Active Problems:    Small bowel obstruction ([address] (12/20/2022)         HPI:     Radha Dominguez III is a 65 y.o. male who has a known history of schizophrenia, GERD, insomnia, BPH lives in an assisted living facility and the team over there called patient's mother as he has been complaining of abdominal pain for the last 3 days and has progressively gotten worse so mother brings him to the emergency room. Patient denies any other complaints. Been having some nausea, no vomiting episodes so far. No fevers or chills no chest pain shortness of breath. Patient's mother is concerned as he has schizophrenia and will need his medications. In the emergency room patient had a CT scan of the abdomen showing small bowel obstruction with a transition point to the right.   Dr. Christella Gilford, general surgery was consulted who recommended the patient be placed within nasogastric tube and recommended admission to the hospitalist.    Past Medical History:   Diagnosis Date    Psychiatric disorder     Schizophrenia        Past Surgical History:   Procedure Laterality Date    HX UROLOGICAL  06/02/2022     TRANSRECTAL ULTRASOUND GUIDED BIOPSY OF THE PROSTATE; Dr. Elyssa Saravia       No family history on file. Social History     Socioeconomic History    Marital status: SINGLE   Tobacco Use    Smoking status: Every Day     Packs/day: 0.50     Types: Cigarettes    Smokeless tobacco: Never   Substance and Sexual Activity    Alcohol use: Yes     Comment: Daily        Prior to Admission medications    Medication Sig Start Date End Date Taking? Authorizing Provider   tamsulosin (FLOMAX) 0.4 mg capsule Take 1 Capsule by mouth daily (after dinner). 11/10/22   Jv Prather PA-C   cyproheptadine (PERIACTIN) 4 mg tablet  9/19/22   Provider, Historical   dicyclomine (BENTYL) 10 mg capsule  9/19/22   Provider, Historical   divalproex DR (DEPAKOTE) 500 mg tablet  9/19/22   Provider, Historical   Therems-M 9 mg iron-400 mcg tab tablet  9/19/22   Provider, Historical   QUEtiapine (SEROquel) 100 mg tablet  9/19/22   Provider, Historical   levoFLOXacin (LEVAQUIN) 750 mg tablet Take 1 Tablet by mouth daily. Take morning of the biopsy 5/6/22   Mary Patel MD   traZODone (DESYREL) 150 mg tablet Take 150 mg by mouth nightly. Provider, Historical   omeprazole (PRILOSEC) 40 mg capsule Take 40 mg by mouth daily. Provider, Historical   OLANZapine (ZyPREXA) 20 mg tablet Take 20 mg by mouth nightly. Provider, Historical   hydrOXYzine HCL (ATARAX) 50 mg tablet Take 50 mg by mouth three (3) times daily as needed. Provider, Historical   docusate sodium (COLACE) 100 mg capsule Take 100 mg by mouth two (2) times a day. Provider, Historical   terbinafine HCL (LAMISIL) 1 % topical cream Apply  to affected area two (2) times a day.     Provider, Historical   ondansetron (Zofran ODT) 4 mg disintegrating tablet 1 Tab by SubLINGual route every eight (8) hours as needed for Nausea or Vomiting. 3/29/21   Miley Bravo PA-C   lithium carbonate (LITHOBID) 150 mg capsule Take  by mouth three (3) times daily. Other, MD Yumiko       Allergies   Allergen Reactions    Other Food Swelling     Peanuts    Peanuts [Peanut] Anaphylaxis    Pcn [Penicillins] Unknown (comments)         Review of Systems  GENERAL: No fevers or chills. HEENT: No change in vision, no earache, tinnitus, sore throat or sinus congestion. NECK: No pain or stiffness. CARDIOVASCULAR: No chest pain or pressure. No palpitations. PULMONARY: No shortness of breath, cough or wheeze. GASTROINTESTINAL: + abdominal pain, nausea, vomiting. No  diarrhea, melena or bright red blood per rectum. GENITOURINARY: No urinary frequency, urgency, hesitancy or dysuria. MUSCULOSKELETAL: No joint or muscle pain, no back pain, no recent trauma. DERMATOLOGIC: No rash, no itching, no lesions. ENDOCRINE: No polyuria, polydipsia, no heat or cold intolerance. No recent change in weight. HEMATOLOGICAL: No anemia or easy bruising or bleeding. NEUROLOGIC: No headache, seizures, numbness, tingling or weakness. PSYCHIATRIC: No depression, anxiety, mood disorder, + insomnia. Physical Exam:     Physical Exam:  Visit Vitals  BP (!) 147/103   Pulse (!) 114   Temp 97.4 °F (36.3 °C)   Resp 14   SpO2 98%           Temp (24hrs), Av.7 °F (36.5 °C), Min:97.4 °F (36.3 °C), Max:97.9 °F (36.6 °C)         General:  Alert, cooperative, no distress, appears stated age. Head:  Normocephalic, without obvious abnormality, atraumatic. Eyes:  Conjunctivae/corneas clear. PERRL, EOMs intact. Nose: Nares normal. No drainage or sinus tenderness. Throat: Lips, mucosa, and tongue normal. Teeth and gums normal.   Neck: Supple, symmetrical, trachea midline, no adenopathy, thyroid: no enlargement/tenderness/nodules, no carotid bruit and no JVD.    Back:   ROM normal. No CVA tenderness. Lungs:   Clear to auscultation bilaterally. Chest wall:  No tenderness or deformity. Heart:  Regular rate and rhythm, S1, S2 normal, no murmur, click, rub or gallop. Abdomen: Soft, ++ tender. No masses,  No organomegaly. Extremities: Extremities normal, atraumatic, no cyanosis or edema. Pulses: 2+ and symmetric all extremities. Skin: Skin color, texture, turgor normal. No rashes or lesions   Neurologic: CNII-XII intact. No focal motor or sensory deficit. Labs Reviewed:    Recent Results (from the past 24 hour(s))   URINALYSIS W/ RFLX MICROSCOPIC    Collection Time: 12/20/22  4:01 PM   Result Value Ref Range    Color DARK YELLOW      Appearance CLEAR      Specific gravity 1.013 1.005 - 1.030      pH (UA) 6.5 5.0 - 8.0      Protein Negative NEG mg/dL    Glucose Negative NEG mg/dL    Ketone TRACE (A) NEG mg/dL    Bilirubin Negative NEG      Blood Negative NEG      Urobilinogen 1.0 0.2 - 1.0 EU/dL    Nitrites Negative NEG      Leukocyte Esterase Negative NEG     CBC WITH AUTOMATED DIFF    Collection Time: 12/20/22  4:45 PM   Result Value Ref Range    WBC 9.0 4.6 - 13.2 K/uL    RBC 4.55 4.35 - 5.65 M/uL    HGB 15.0 13.0 - 16.0 g/dL    HCT 42.9 36.0 - 48.0 %    MCV 94.3 78.0 - 100.0 FL    MCH 33.0 24.0 - 34.0 PG    MCHC 35.0 31.0 - 37.0 g/dL    RDW 13.6 11.6 - 14.5 %    PLATELET 078 713 - 456 K/uL    MPV 10.6 9.2 - 11.8 FL    NRBC 0.0 0  WBC    ABSOLUTE NRBC 0.00 0.00 - 0.01 K/uL    NEUTROPHILS 67 40 - 73 %    LYMPHOCYTES 19 (L) 21 - 52 %    MONOCYTES 12 (H) 3 - 10 %    EOSINOPHILS 1 0 - 5 %    BASOPHILS 0 0 - 2 %    IMMATURE GRANULOCYTES 0 0.0 - 0.5 %    ABS. NEUTROPHILS 6.0 1.8 - 8.0 K/UL    ABS. LYMPHOCYTES 1.8 0.9 - 3.6 K/UL    ABS. MONOCYTES 1.1 0.05 - 1.2 K/UL    ABS. EOSINOPHILS 0.1 0.0 - 0.4 K/UL    ABS. BASOPHILS 0.0 0.0 - 0.1 K/UL    ABS. IMM.  GRANS. 0.0 0.00 - 0.04 K/UL    DF AUTOMATED     LIPASE    Collection Time: 12/20/22  4:45 PM   Result Value Ref Range    Lipase 51 (L) 73 - 093 U/L   METABOLIC PANEL, COMPREHENSIVE    Collection Time: 12/20/22  4:45 PM   Result Value Ref Range    Sodium 130 (L) 136 - 145 mmol/L    Potassium 4.2 3.5 - 5.5 mmol/L    Chloride 93 (L) 100 - 111 mmol/L    CO2 31 21 - 32 mmol/L    Anion gap 6 3.0 - 18 mmol/L    Glucose 111 (H) 74 - 99 mg/dL    BUN 12 7.0 - 18 MG/DL    Creatinine 1.27 0.6 - 1.3 MG/DL    BUN/Creatinine ratio 9 (L) 12 - 20      eGFR >60 >60 ml/min/1.73m2    Calcium 9.2 8.5 - 10.1 MG/DL    Bilirubin, total 0.8 0.2 - 1.0 MG/DL    ALT (SGPT) 23 16 - 61 U/L    AST (SGOT) 9 (L) 10 - 38 U/L    Alk. phosphatase 72 45 - 117 U/L    Protein, total 8.6 (H) 6.4 - 8.2 g/dL    Albumin 3.5 3.4 - 5.0 g/dL    Globulin 5.1 (H) 2.0 - 4.0 g/dL    A-G Ratio 0.7 (L) 0.8 - 1.7         Procedures/imaging: see electronic medical records for all procedures/Xrays and details which were not copied into this note but were reviewed prior to creation of Lindy Geronimo MD  December 21, 2022  [phone].

## 2022-12-21 NOTE — PROGRESS NOTES
[facility]  Progress Note    Patient: Rosalina Kingr JOSHUA Age: 65 y.o. : 1960 MR#: [id number] SSN: [id number]  Date: 2022     Subjective/24-hour events:     Chart reviewed. Admitted this morning by night coverage. Remains roomed in ED. Assessment:   Small bowel obstruction  Schizophrenia  GERD  BPH    Plan:   IV fluids. NPO except minimal sips with meds. Patient has refused placement of another NG tube as he self-removed the tube that was placed on arrival.  Monitor lytes, replace as necessary. Follow up again formally in AM.  May need to contact family to see if they can assist in helping with patient compliance given underlying psychiatric history.     Case discussed with:  []Patient  []Family  []Nursing  []Case Management  DVT Prophylaxis:  [x]Lovenox  []Hep SQ  []SCDs  []Coumadin   []On Heparin gtt    Objective:   VS: Visit Vitals  /82   Pulse (!) 116   Temp 97.4 °F (36.3 °C)   Resp 24   Ht 5' 11\" (1.803 m)   Wt 73.9 kg (163 lb)   SpO2 100%   BMI 22.73 kg/m²      Tmax/24hrs: Temp (24hrs), Av.7 °F (36.5 °C), Min:97.4 °F (36.3 °C), Max:97.9 °F (36.6 °C)    Intake/Output Summary (Last 24 hours) at 2022 1249  Last data filed at 2022 1221  Gross per 24 hour   Intake --   Output 400 ml   Net -400 ml       Labs:    Recent Results (from the past 24 hour(s))   URINALYSIS W/ RFLX MICROSCOPIC    Collection Time: 22  4:01 PM   Result Value Ref Range    Color DARK YELLOW      Appearance CLEAR      Specific gravity 1.013 1.005 - 1.030      pH (UA) 6.5 5.0 - 8.0      Protein Negative NEG mg/dL    Glucose Negative NEG mg/dL    Ketone TRACE (A) NEG mg/dL    Bilirubin Negative NEG      Blood Negative NEG      Urobilinogen 1.0 0.2 - 1.0 EU/dL    Nitrites Negative NEG      Leukocyte Esterase Negative NEG     CBC WITH AUTOMATED DIFF    Collection Time: 22  4:45 PM   Result Value Ref Range    WBC 9.0 4.6 - 13.2 K/uL    RBC 4.55 4.35 - 5.65 M/uL    HGB 15.0 13.0 - 16.0 g/dL    HCT 42.9 36.0 - 48.0 %    MCV 94.3 78.0 - 100.0 FL    MCH 33.0 24.0 - 34.0 PG    MCHC 35.0 31.0 - 37.0 g/dL    RDW 13.6 11.6 - 14.5 %    PLATELET 765 942 - 234 K/uL    MPV 10.6 9.2 - 11.8 FL    NRBC 0.0 0  WBC    ABSOLUTE NRBC 0.00 0.00 - 0.01 K/uL    NEUTROPHILS 67 40 - 73 %    LYMPHOCYTES 19 (L) 21 - 52 %    MONOCYTES 12 (H) 3 - 10 %    EOSINOPHILS 1 0 - 5 %    BASOPHILS 0 0 - 2 %    IMMATURE GRANULOCYTES 0 0.0 - 0.5 %    ABS. NEUTROPHILS 6.0 1.8 - 8.0 K/UL    ABS. LYMPHOCYTES 1.8 0.9 - 3.6 K/UL    ABS. MONOCYTES 1.1 0.05 - 1.2 K/UL    ABS. EOSINOPHILS 0.1 0.0 - 0.4 K/UL    ABS. BASOPHILS 0.0 0.0 - 0.1 K/UL    ABS. IMM. GRANS. 0.0 0.00 - 0.04 K/UL    DF AUTOMATED     LIPASE    Collection Time: 12/20/22  4:45 PM   Result Value Ref Range    Lipase 51 (L) 73 - 975 U/L   METABOLIC PANEL, COMPREHENSIVE    Collection Time: 12/20/22  4:45 PM   Result Value Ref Range    Sodium 130 (L) 136 - 145 mmol/L    Potassium 4.2 3.5 - 5.5 mmol/L    Chloride 93 (L) 100 - 111 mmol/L    CO2 31 21 - 32 mmol/L    Anion gap 6 3.0 - 18 mmol/L    Glucose 111 (H) 74 - 99 mg/dL    BUN 12 7.0 - 18 MG/DL    Creatinine 1.27 0.6 - 1.3 MG/DL    BUN/Creatinine ratio 9 (L) 12 - 20      eGFR >60 >60 ml/min/1.73m2    Calcium 9.2 8.5 - 10.1 MG/DL    Bilirubin, total 0.8 0.2 - 1.0 MG/DL    ALT (SGPT) 23 16 - 61 U/L    AST (SGOT) 9 (L) 10 - 38 U/L    Alk.  phosphatase 72 45 - 117 U/L    Protein, total 8.6 (H) 6.4 - 8.2 g/dL    Albumin 3.5 3.4 - 5.0 g/dL    Globulin 5.1 (H) 2.0 - 4.0 g/dL    A-G Ratio 0.7 (L) 0.8 - 1.7         Signed By: Dexter Goodwin MD     December 21, 2022

## 2022-12-21 NOTE — ED NOTES
This nurse is no longer primary. Patient moved to hallway. Last seen alert at baseline, vitals stable. Mother at bedside. Uses urinal. Has bowel obstruction; however, MR mental status inhibits NG tube insertion.

## 2022-12-21 NOTE — ED NOTES
Dr. Shannan Barney returned paged. Stated that whether or not to replace the ngt will be up to the surgeon. I also informed him patient stated he would also like to go home.

## 2022-12-21 NOTE — ED NOTES
IV site replaced. Patient refused to  have NG tube placed. Pulled out previous IV site. Refused telemetry monitoring. Despite tachy cardia, at 105-115, all vitals are stable. Agrees to leave newly placed IV site in place. Patient is MR at baseline.

## 2022-12-21 NOTE — ED NOTES
Surgeon, dr pérez, returned page and stated to document that patient is refusing ngt.  No new orders given

## 2022-12-21 NOTE — ED NOTES
Patient pulled out NGT right after xray for placement confirmation. He then stated he will pull it out again if it is placed in again.  Will daniel MURILLO.

## 2022-12-21 NOTE — CONSULTS
HPI: Lincoln Taylor III is a 65 y.o. male presenting with chief complain of abdominal pain. Pain began several days ago and is moderate, diffuse and crampy. Pt states he may have had bm, but according to nurse no BM since presentation to ED yesterday. No history of surgery per patient. No history of colonoscopy per patient. Further HPI unable to obtain, pt minimally conversant. Past Medical History:   Diagnosis Date    Psychiatric disorder     Schizophrenia        Past Surgical History:   Procedure Laterality Date    HX UROLOGICAL  06/02/2022     TRANSRECTAL ULTRASOUND GUIDED BIOPSY OF THE PROSTATE; Dr. Suzette Alicea       [facility] disoders per patient    Social History     Socioeconomic History    Marital status: SINGLE   Tobacco Use    Smoking status: Every Day     Packs/day: 0.50     Types: Cigarettes    Smokeless tobacco: Never   Substance and Sexual Activity    Alcohol use: Yes     Comment: Daily        Review of Systems - all others negative    [unfilled]    Allergies   Allergen Reactions    Other Food Swelling     Peanuts    Peanuts [Peanut] Anaphylaxis    Pcn [Penicillins] Unknown (comments)       Vitals:    12/20/22 2005 12/21/22 0830 12/21/22 1005 12/21/22 1007   BP: (!) 147/103 126/85 (!) 135/90 (!) 135/90   Pulse: (!) 114 (!) 116     Resp: 14 24     Temp: 97.4 °F (36.3 °C)      SpO2:  97% 100% 100%   Weight:    73.9 kg (163 lb)   Height:    5' 11\" (1.803 m)       Physical Exam  Constitutional:       Appearance: He is well-developed. HENT:      Head: Normocephalic and atraumatic. Eyes:      Conjunctiva/sclera: Conjunctivae normal.   Abdominal:      General: There is distension (moderate). Palpations: Abdomen is soft. There is no mass. Tenderness: There is no abdominal tenderness. There is no guarding. Musculoskeletal:         General: Normal range of motion. Lymphadenopathy:      Cervical: No cervical adenopathy. Skin:     General: Skin is warm and dry.    Neurological: Sensory: No sensory deficit. Psychiatric:         Speech: Speech normal.     CMP:   Lab Results   Component Value Date/Time     (L) 12/20/2022 04:45 PM    K 4.2 12/20/2022 04:45 PM    CL 93 (L) 12/20/2022 04:45 PM    CO2 31 12/20/2022 04:45 PM    AGAP 6 12/20/2022 04:45 PM     (H) 12/20/2022 04:45 PM    BUN 12 12/20/2022 04:45 PM    CREA 1.27 12/20/2022 04:45 PM    CA 9.2 12/20/2022 04:45 PM    ALB 3.5 12/20/2022 04:45 PM    TP 8.6 (H) 12/20/2022 04:45 PM    GLOB 5.1 (H) 12/20/2022 04:45 PM    AGRAT 0.7 (L) 12/20/2022 04:45 PM    ALT 23 12/20/2022 04:45 PM     CBC:   Lab Results   Component Value Date/Time    WBC 9.0 12/20/2022 04:45 PM    HGB 15.0 12/20/2022 04:45 PM    HCT 42.9 12/20/2022 04:45 PM     12/20/2022 04:45 PM     CT abd pelvis personally visualized, multiple dilated small bowel loops, though there are some dilated colonic loops as well    Assessment / Plan    PSBO  NPO, IVF  Pt refusing NG and pulled out first on placed  Will follow  Psych meds per hospitalist    The diagnoses and plan were discussed with the patient. All questions answered. Plan of care agreed to by all concerned.   Consults

## 2022-12-22 LAB
ANION GAP SERPL CALC-SCNC: 2 MMOL/L (ref 3–18)
BUN SERPL-MCNC: 13 MG/DL (ref 7–18)
BUN/CREAT SERPL: 14 (ref 12–20)
CALCIUM SERPL-MCNC: 8.5 MG/DL (ref 8.5–10.1)
CHLORIDE SERPL-SCNC: 103 MMOL/L (ref 100–111)
CO2 SERPL-SCNC: 31 MMOL/L (ref 21–32)
CREAT SERPL-MCNC: 0.96 MG/DL (ref 0.6–1.3)
ERYTHROCYTE [DISTWIDTH] IN BLOOD BY AUTOMATED COUNT: 13.6 % (ref 11.6–14.5)
GLUCOSE SERPL-MCNC: 132 MG/DL (ref 74–99)
HCT VFR BLD AUTO: 38.6 % (ref 36–48)
HGB BLD-MCNC: 13.8 G/DL (ref 13–16)
MCH RBC QN AUTO: 33.5 PG (ref 24–34)
MCHC RBC AUTO-ENTMCNC: 35.8 G/DL (ref 31–37)
MCV RBC AUTO: 93.7 FL (ref 78–100)
NRBC # BLD: 0 K/UL (ref 0–0.01)
NRBC BLD-RTO: 0 PER 100 WBC
PLATELET # BLD AUTO: 280 K/UL (ref 135–420)
PMV BLD AUTO: 10.6 FL (ref 9.2–11.8)
POTASSIUM SERPL-SCNC: 3.7 MMOL/L (ref 3.5–5.5)
RBC # BLD AUTO: 4.12 M/UL (ref 4.35–5.65)
SODIUM SERPL-SCNC: 136 MMOL/L (ref 136–145)
WBC # BLD AUTO: 7.9 K/UL (ref 4.6–13.2)

## 2022-12-22 PROCEDURE — 74011250637 HC RX REV CODE- 250/637: Performed by: HOSPITALIST

## 2022-12-22 PROCEDURE — 74011000250 HC RX REV CODE- 250: Performed by: HOSPITALIST

## 2022-12-22 PROCEDURE — 80048 BASIC METABOLIC PNL TOTAL CA: CPT

## 2022-12-22 PROCEDURE — 99232 SBSQ HOSP IP/OBS MODERATE 35: CPT | Performed by: FAMILY MEDICINE

## 2022-12-22 PROCEDURE — 65270000029 HC RM PRIVATE

## 2022-12-22 PROCEDURE — 85027 COMPLETE CBC AUTOMATED: CPT

## 2022-12-22 PROCEDURE — 99232 SBSQ HOSP IP/OBS MODERATE 35: CPT | Performed by: COLON & RECTAL SURGERY

## 2022-12-22 PROCEDURE — 74011250636 HC RX REV CODE- 250/636: Performed by: HOSPITALIST

## 2022-12-22 RX ADMIN — ENOXAPARIN SODIUM 40 MG: 100 INJECTION SUBCUTANEOUS at 15:25

## 2022-12-22 RX ADMIN — TAMSULOSIN HYDROCHLORIDE 0.4 MG: 0.4 CAPSULE ORAL at 18:34

## 2022-12-22 RX ADMIN — OLANZAPINE 20 MG: 20 TABLET, ORALLY DISINTEGRATING ORAL at 00:15

## 2022-12-22 RX ADMIN — LITHIUM CARBONATE 150 MG: 150 CAPSULE, GELATIN COATED ORAL at 18:34

## 2022-12-22 RX ADMIN — QUETIAPINE FUMARATE 100 MG: 100 TABLET ORAL at 00:15

## 2022-12-22 NOTE — ED NOTES
Patient refused for AM labs to be drawn from existing IV. Patient noted to be hallucinating and responding to internal stimuli.

## 2022-12-22 NOTE — ED NOTES
Patient request something to drink. Dr. Reece Heart at bedside and stated that if patient can tolerate fluids he is ok with patient having clear liquids.  Patient tolerated fluids without difficulty

## 2022-12-22 NOTE — ED NOTES
Dr. Vince Mckeon present in ED and made aware of patient refusals of labs and IV fluids. No new orders received at this time.

## 2022-12-22 NOTE — ED NOTES
Patient unhooked from IV fluids to ambulate to restroom. Upon returning to [facility], patient refusing to be reconnected to fluids. Patient stated, \"I'm not going to take too much more of this. \"

## 2022-12-22 NOTE — PROGRESS NOTES
[facility] EMERGENCY DEPT  [address] [address]  [phone]  Colon and Rectal Surgery Progress Note      Patient: Yuly Ingram MRN: [id number]  SSN: [id number]    YOB: 1960  Age: 65 y.o. Sex: male      Admit Date: 12/20/2022    LOS: 2 days     Subjective:     Denies abdominal pain. States he is passing flatus. No BM since prior to admission. Objective:     Vitals:    12/21/22 1245 12/21/22 1300 12/21/22 1315 12/21/22 1630   BP: 130/83 (!) 134/101 (!) 134/96 (!) 135/92   Pulse:    (!) 108   Resp:    21   Temp:       SpO2:    100%   Weight:       Height:            Intake and Output:  Current Shift: No intake/output data recorded.   Last three shifts: 12/20 1901 - 12/22 0700  In: -   Out: 400 [Urine:400]    Physical Exam:     Constitutional: well developed, in NAD  Abdomen: Softer, nontender nondistended    Lab/Data Review:    Patient refusing    Assessment:     Small bowel obstruction, partial, appears resolving    Plan:     Advance to clear liquids  Monitor for good bowel function    Signed By: Michaela Tello MD        December 22, 2022

## 2022-12-22 NOTE — ED NOTES
Patient resting quietly with eyes closed. No s/s of distress noted. IV fluids continuously. Patient remains NPO.

## 2022-12-22 NOTE — ED NOTES
Received patient this morning sitting up in bed. No s/s of distress. This nurse informed patient that he need an NGT due to his bowel obstruction. Patient refused NGT. Dr. Tawnya Hinds aware. No new orders received.

## 2022-12-22 NOTE — PROGRESS NOTES
[facility]  Progress Note    Patient: Lincoln Taylor III Age: 65 y.o. : 1960 MR#: [id number] SSN: [id number]  Date: 2022     Subjective/24-hour events:     Patient remains roomed in ED. Has continued to refuse NG tube placement. Patient denies abdominal pain and nausea vomiting. Hungry and wants to eat. States that he has passed flatus but denies having a bowel movement since admission. Assessment:   Partial small bowel obstruction  Schizophrenia  GERD  BPH    Plan:   Initiate clear liquids, monitor for tolerance. Advance as able. Continue meds as previously prescribed. Trend electrolytes replace as necessary. Mobilize as tolerated. Case discussed with:  [x]Patient  []Family  [x]Nursing  [x]Case Management  DVT Prophylaxis:  [x]Lovenox  []Hep SQ  []SCDs  []Coumadin   []On Heparin gtt    Objective:   VS: Visit Vitals  BP (!) 135/92   Pulse (!) 108   Temp 97.4 °F (36.3 °C)   Resp 21   Ht 5' 11\" (1.803 m)   Wt 73.9 kg (163 lb)   SpO2 100%   BMI 22.73 kg/m²      Tmax/24hrs: No data recorded. Intake/Output Summary (Last 24 hours) at 2022 1215  Last data filed at 2022 1221  Gross per 24 hour   Intake --   Output 400 ml   Net -400 ml       Labs:    No results found for this or any previous visit (from the past 24 hour(s)).       Signed By: Mami Hong MD     2022

## 2022-12-23 LAB
ANION GAP SERPL CALC-SCNC: 5 MMOL/L (ref 3–18)
BUN SERPL-MCNC: 13 MG/DL (ref 7–18)
BUN/CREAT SERPL: 15 (ref 12–20)
CALCIUM SERPL-MCNC: 8.6 MG/DL (ref 8.5–10.1)
CHLORIDE SERPL-SCNC: 106 MMOL/L (ref 100–111)
CO2 SERPL-SCNC: 28 MMOL/L (ref 21–32)
CREAT SERPL-MCNC: 0.88 MG/DL (ref 0.6–1.3)
GLUCOSE SERPL-MCNC: 74 MG/DL (ref 74–99)
POTASSIUM SERPL-SCNC: 4.1 MMOL/L (ref 3.5–5.5)
SODIUM SERPL-SCNC: 139 MMOL/L (ref 136–145)

## 2022-12-23 PROCEDURE — 74011000250 HC RX REV CODE- 250: Performed by: HOSPITALIST

## 2022-12-23 PROCEDURE — 99232 SBSQ HOSP IP/OBS MODERATE 35: CPT | Performed by: COLON & RECTAL SURGERY

## 2022-12-23 PROCEDURE — 74011250637 HC RX REV CODE- 250/637: Performed by: FAMILY MEDICINE

## 2022-12-23 PROCEDURE — 2709999900 HC NON-CHARGEABLE SUPPLY

## 2022-12-23 PROCEDURE — 74011250637 HC RX REV CODE- 250/637: Performed by: HOSPITALIST

## 2022-12-23 PROCEDURE — 80048 BASIC METABOLIC PNL TOTAL CA: CPT

## 2022-12-23 PROCEDURE — 74011250636 HC RX REV CODE- 250/636: Performed by: HOSPITALIST

## 2022-12-23 PROCEDURE — 65270000029 HC RM PRIVATE

## 2022-12-23 PROCEDURE — 99232 SBSQ HOSP IP/OBS MODERATE 35: CPT | Performed by: FAMILY MEDICINE

## 2022-12-23 PROCEDURE — 36415 COLL VENOUS BLD VENIPUNCTURE: CPT

## 2022-12-23 RX ORDER — POLYETHYLENE GLYCOL 3350 17 G/17G
17 POWDER, FOR SOLUTION ORAL DAILY
Status: DISCONTINUED | OUTPATIENT
Start: 2022-12-23 | End: 2022-12-25 | Stop reason: HOSPADM

## 2022-12-23 RX ORDER — DOCUSATE SODIUM 100 MG/1
100 CAPSULE, LIQUID FILLED ORAL 2 TIMES DAILY
Status: DISCONTINUED | OUTPATIENT
Start: 2022-12-23 | End: 2022-12-25 | Stop reason: HOSPADM

## 2022-12-23 RX ADMIN — SODIUM CHLORIDE, PRESERVATIVE FREE 10 ML: 5 INJECTION INTRAVENOUS at 06:22

## 2022-12-23 RX ADMIN — POLYETHYLENE GLYCOL 3350 17 G: 17 POWDER, FOR SOLUTION ORAL at 13:43

## 2022-12-23 RX ADMIN — DOCUSATE SODIUM 100 MG: 100 CAPSULE, LIQUID FILLED ORAL at 18:37

## 2022-12-23 RX ADMIN — LITHIUM CARBONATE 150 MG: 150 CAPSULE, GELATIN COATED ORAL at 18:37

## 2022-12-23 RX ADMIN — SODIUM CHLORIDE, PRESERVATIVE FREE 10 ML: 5 INJECTION INTRAVENOUS at 14:00

## 2022-12-23 RX ADMIN — LITHIUM CARBONATE 150 MG: 150 CAPSULE, GELATIN COATED ORAL at 21:41

## 2022-12-23 RX ADMIN — QUETIAPINE FUMARATE 100 MG: 100 TABLET ORAL at 21:41

## 2022-12-23 RX ADMIN — DOCUSATE SODIUM 100 MG: 100 CAPSULE, LIQUID FILLED ORAL at 13:42

## 2022-12-23 RX ADMIN — OLANZAPINE 20 MG: 20 TABLET, ORALLY DISINTEGRATING ORAL at 21:41

## 2022-12-23 RX ADMIN — TRAZODONE HYDROCHLORIDE 150 MG: 50 TABLET ORAL at 21:41

## 2022-12-23 RX ADMIN — ENOXAPARIN SODIUM 40 MG: 100 INJECTION SUBCUTANEOUS at 09:23

## 2022-12-23 RX ADMIN — LITHIUM CARBONATE 150 MG: 150 CAPSULE, GELATIN COATED ORAL at 09:22

## 2022-12-23 RX ADMIN — TAMSULOSIN HYDROCHLORIDE 0.4 MG: 0.4 CAPSULE ORAL at 18:37

## 2022-12-23 NOTE — PROGRESS NOTES
Bedside shift change report given to Sheba Benites RN (oncoming nurse) by Saji Seay RN (offgoing nurse). Report included the following information SBAR, Kardex, Intake/Output, MAR, and Recent Results.       Wound Prevention Checklist    Patient: Sheila Mcguire III (47 y.o. male)  Date: 12/22/2022  Diagnosis: Small bowel obstruction ([address] [K56.609] <principal problem not specified>    Precautions:         []  Heel prevention boots placed on patient    []  Patient turned q2h during shift    []  Lift team ordered    []  Patient on Briana bed/Specialty bed    []  Each Wound is documented during shift (Stage, Color, drainage, odor, measurements, and dressings)    [x]  Dual skin check done with JARRETT Perez

## 2022-12-23 NOTE — PROGRESS NOTES
[facility] [address] [address]  [phone]  Colon and Rectal Surgery Progress Note      Patient: Kayla Carrera MRN: [id number]  SSN: [id number]    YOB: 1960  Age: 65 y.o. Sex: male      Admit Date: 12/20/2022    LOS: 3 days     Subjective: Tolerating clears. Denies bowel function. Objective:     Vitals:    12/22/22 2026 12/23/22 0005 12/23/22 0415 12/23/22 0824   BP: 118/73 137/85 121/76 106/74   Pulse: 98 94 (!) 109 (!) 108   Resp: 20 18 20 20   Temp: 98.2 °F (36.8 °C) 98.4 °F (36.9 °C) 98.2 °F (36.8 °C) 98.5 °F (36.9 °C)   SpO2: 99% 97% 94% 99%   Weight:       Height:            Intake and Output:  Current Shift: No intake/output data recorded. Last three shifts: No intake/output data recorded.     Physical Exam:     Constitutional: well developed, in NAD  Abdomen: Softer, nontender nondistended    Lab/Data Review:    Patient refusing    Assessment:     Small bowel obstruction, partial, appears resolving    Plan:     Advance to full liquids  Monitor for good bowel function    Signed By: Spenser Castano MD        December 23, 2022

## 2022-12-23 NOTE — ACP (ADVANCE CARE PLANNING)
Advance Care Planning   Advance Care Planning Inpatient Note  [facility]  Spiritual Care Department    Today's Date: 12/23/2022  [address]: [address]    Received request from . Upon review of chart and communication with care team, patient's decision making abilities are not in question. Patient was/were present in the room during visit. Goals of ACP Conversation:  Discuss Advance Care planning documents    Health Care Decision Makers:    No healthcare decision makers have been documented. Click here to complete [address] including selection of the Healthcare Decision Maker Relationship (ie \"Primary\")  Summary:  No Decision Maker named by patient at this time    Advance Care Planning Documents (Patient Wishes) on file:  None     Assessment:    Patient seen in bed G of the emergency room where patient will be admitted to the [facility] behavioral med unit. There  is no advance directive present.     Interventions:  Deferred conversation as patient not interested in completing an advance directive at this time    Care Preferences Communicated:  No    Outcomes/Plan:      Akhil Infante [facility] on 12/23/2022 at 6:46 AM

## 2022-12-23 NOTE — PROGRESS NOTES
Problem: Falls - Risk of  Goal: *Absence of Falls  Description: Document Grace Michael Fall Risk and appropriate interventions in the flowsheet.   Outcome: Progressing Towards Goal  Note: Fall Risk Interventions:  Mobility Interventions: Bed/chair exit alarm, Patient to call before getting OOB                             Problem: Patient Education: Go to Patient Education Activity  Goal: Patient/Family Education  Outcome: Progressing Towards Goal

## 2022-12-23 NOTE — PROGRESS NOTES
Patient refused medication after approaching and informing about medication and offering beverage of his choice. Patient agreed and select his beverage. Writer approached patient covered head with bed linen and started rocking back and forth in bed. Writer repeated his name several times and reason for medication. Dr. Shaila Chairez informed. Suggested continue returning and asking patient to take medication and if continue refusing give intramuscular. 12/23/2022  0020 Patient administered medication and taken at this time. Placed linen over face and head with the rocking motion. Will communicate to oncoming nurse.

## 2022-12-23 NOTE — PROGRESS NOTES
[facility]    Patient: Ifeanyi Dowd III Age: 65 y.o. : 1960 MR#: [id number] SSN: [id number]  Date: 2022     Subjective/24-hour events:     Siting in chair at bedside. Assessment:   Partial small bowel obstruction  Schizophrenia  GERD  BPH    Plan:   Advance to full liquids, monitor tolerance. Continue IV fluids for now. Add Colace and MiraLAX. Monitor labs. Stable. Encouraged mobilization. Updated mother by phone, questions answered. Supportive care otherwise. Follow.     Case discussed with:  [x]Patient  [x]Family  [x]Nursing  [x]Case Management  DVT Prophylaxis:  [x]Lovenox  []Hep SQ  []SCDs  []Coumadin   []On Heparin gtt    Objective:   VS: Visit Vitals  /74   Pulse (!) 108   Temp 98.5 °F (36.9 °C)   Resp 20   Ht 6' 3\" (1.905 m)   Wt 68 kg (150 lb)   SpO2 99%   BMI 18.75 kg/m²      Tmax/24hrs: Temp (24hrs), Av.6 °F (37 °C), Min:98.2 °F (36.8 °C), Max:99.4 °F (37.4 °C)  No intake or output data in the 24 hours ending 22 1137      Labs:    Recent Results (from the past 24 hour(s))   METABOLIC PANEL, BASIC    Collection Time: 22  3:39 PM   Result Value Ref Range    Sodium 136 136 - 145 mmol/L    Potassium 3.7 3.5 - 5.5 mmol/L    Chloride 103 100 - 111 mmol/L    CO2 31 21 - 32 mmol/L    Anion gap 2 (L) 3.0 - 18 mmol/L    Glucose 132 (H) 74 - 99 mg/dL    BUN 13 7.0 - 18 MG/DL    Creatinine 0.96 0.6 - 1.3 MG/DL    BUN/Creatinine ratio 14 12 - 20      eGFR >60 >60 ml/min/1.73m2    Calcium 8.5 8.5 - 10.1 MG/DL   CBC W/O DIFF    Collection Time: 22  3:39 PM   Result Value Ref Range    WBC 7.9 4.6 - 13.2 K/uL    RBC 4.12 (L) 4.35 - 5.65 M/uL    HGB 13.8 13.0 - 16.0 g/dL    HCT 38.6 36.0 - 48.0 %    MCV 93.7 78.0 - 100.0 FL    MCH 33.5 24.0 - 34.0 PG    MCHC 35.8 31.0 - 37.0 g/dL    RDW 13.6 11.6 - 14.5 %    PLATELET 409 818 - 410 K/uL    MPV 10.6 9.2 - 11.8 FL    NRBC 0.0 0  WBC    ABSOLUTE NRBC 0.00 0.00 - 7.95 K/uL   METABOLIC PANEL, BASIC    Collection Time: 12/23/22  2:59 AM   Result Value Ref Range    Sodium 139 136 - 145 mmol/L    Potassium 4.1 3.5 - 5.5 mmol/L    Chloride 106 100 - 111 mmol/L    CO2 28 21 - 32 mmol/L    Anion gap 5 3.0 - 18 mmol/L    Glucose 74 74 - 99 mg/dL    BUN 13 7.0 - 18 MG/DL    Creatinine 0.88 0.6 - 1.3 MG/DL    BUN/Creatinine ratio 15 12 - 20      eGFR >60 >60 ml/min/1.73m2    Calcium 8.6 8.5 - 10.1 MG/DL         Signed By: Max Valencia MD     December 23, 2022

## 2022-12-24 LAB
ANION GAP SERPL CALC-SCNC: 4 MMOL/L (ref 3–18)
BUN SERPL-MCNC: 6 MG/DL (ref 7–18)
BUN/CREAT SERPL: 6 (ref 12–20)
CALCIUM SERPL-MCNC: 8.6 MG/DL (ref 8.5–10.1)
CHLORIDE SERPL-SCNC: 107 MMOL/L (ref 100–111)
CO2 SERPL-SCNC: 30 MMOL/L (ref 21–32)
CREAT SERPL-MCNC: 0.93 MG/DL (ref 0.6–1.3)
GLUCOSE SERPL-MCNC: 75 MG/DL (ref 74–99)
POTASSIUM SERPL-SCNC: 3.7 MMOL/L (ref 3.5–5.5)
SODIUM SERPL-SCNC: 141 MMOL/L (ref 136–145)

## 2022-12-24 PROCEDURE — 99232 SBSQ HOSP IP/OBS MODERATE 35: CPT | Performed by: FAMILY MEDICINE

## 2022-12-24 PROCEDURE — 65270000029 HC RM PRIVATE

## 2022-12-24 PROCEDURE — 74011250637 HC RX REV CODE- 250/637: Performed by: HOSPITALIST

## 2022-12-24 PROCEDURE — 36415 COLL VENOUS BLD VENIPUNCTURE: CPT

## 2022-12-24 PROCEDURE — 74011000250 HC RX REV CODE- 250: Performed by: HOSPITALIST

## 2022-12-24 PROCEDURE — 80048 BASIC METABOLIC PNL TOTAL CA: CPT

## 2022-12-24 PROCEDURE — 99232 SBSQ HOSP IP/OBS MODERATE 35: CPT | Performed by: COLON & RECTAL SURGERY

## 2022-12-24 RX ADMIN — OLANZAPINE 20 MG: 20 TABLET, ORALLY DISINTEGRATING ORAL at 22:01

## 2022-12-24 RX ADMIN — LITHIUM CARBONATE 150 MG: 150 CAPSULE, GELATIN COATED ORAL at 11:37

## 2022-12-24 RX ADMIN — TAMSULOSIN HYDROCHLORIDE 0.4 MG: 0.4 CAPSULE ORAL at 17:45

## 2022-12-24 RX ADMIN — TRAZODONE HYDROCHLORIDE 150 MG: 50 TABLET ORAL at 22:00

## 2022-12-24 RX ADMIN — QUETIAPINE FUMARATE 100 MG: 100 TABLET ORAL at 22:01

## 2022-12-24 RX ADMIN — LITHIUM CARBONATE 150 MG: 150 CAPSULE, GELATIN COATED ORAL at 17:45

## 2022-12-24 RX ADMIN — SODIUM CHLORIDE, PRESERVATIVE FREE 10 ML: 5 INJECTION INTRAVENOUS at 22:01

## 2022-12-24 RX ADMIN — SODIUM CHLORIDE, PRESERVATIVE FREE 10 ML: 5 INJECTION INTRAVENOUS at 14:00

## 2022-12-24 RX ADMIN — LITHIUM CARBONATE 150 MG: 150 CAPSULE, GELATIN COATED ORAL at 22:00

## 2022-12-24 NOTE — PROGRESS NOTES
Bedside shift change report given to Jose Elliott RN (oncoming nurse) by Randy Christensen RN (offgoing nurse). Report included the following information SBAR, Kardex, Intake/Output, MAR, and Recent Results.       Wound Prevention Checklist    Patient: Eloy Swanson III (89 y.o. male)  Date: 12/23/2022  Diagnosis: Small bowel obstruction ([facility] [address] [K56.609] <principal problem not specified>    Precautions:         []  Heel prevention boots placed on patient    []  Patient turned q2h during shift    []  Lift team ordered    []  Patient on [place] bed/Specialty bed    []  Each Wound is documented during shift (Stage, Color, drainage, odor, measurements, and dressings)    [x]  Dual skin check done with JARRETT Bennett

## 2022-12-24 NOTE — ROUTINE PROCESS
Bedside and Verbal shift change report given to Kim Baker RN (oncoming nurse) by Brigido Dodd RN (offgoing nurse). Report included the following information SBAR, Kardex, and MAR.

## 2022-12-24 NOTE — PROGRESS NOTES
[facility]  Progress Note    Patient: Carlos Rodgers III Age: 65 y.o. : 1960 MR#: [id number] SSN: [id number]  Date: 2022     Subjective/24-hour events: Tolerated full liquids without difficulty. Denies nausea/vomiting, no abdominal pain reported. Reports having BM tlast night. Assessment:   Partial small bowel obstruction  Schizophrenia  GERD  BPH    Plan:   Continue diet advancement as tolerated. Management per surgery. Continue fluids, monitor electrolytes. Colace and MiraLAX as ordered. Mobilize as tolerated. Encouraged ambulation. Home soon if continues to improve. Case discussed with:  [x]Patient  []Family  [x]Nursing  [x]Case Management  DVT Prophylaxis:  [x]Lovenox  []Hep SQ  []SCDs  []Coumadin   []On Heparin gtt    Objective:   VS: Visit Vitals  /79   Pulse (!) 108   Temp 98.6 °F (37 °C)   Resp 20   Ht 6' 3\" (1.905 m)   Wt 68 kg (150 lb)   SpO2 94%   BMI 18.75 kg/m²      Tmax/24hrs: Temp (24hrs), Av.4 °F (36.9 °C), Min:97.8 °F (36.6 °C), Max:98.6 °F (37 °C)  No intake or output data in the 24 hours ending 22 0931      General:  In NAD. Nontoxic-appearing. Cardiovascular:  RRR. Pulmonary:  Lungs clear bilaterally, no wheezes. No accessory muscle use. GI:  Abdomen soft, NTTP. Extremities:  Warm, no edema or ischemia. Neuro:  Awake and alert.   Moves extremities spontaneously, motor grossly nonfocal.      Labs:    Recent Results (from the past 24 hour(s))   METABOLIC PANEL, BASIC    Collection Time: 22  1:54 AM   Result Value Ref Range    Sodium 141 136 - 145 mmol/L    Potassium 3.7 3.5 - 5.5 mmol/L    Chloride 107 100 - 111 mmol/L    CO2 30 21 - 32 mmol/L    Anion gap 4 3.0 - 18 mmol/L    Glucose 75 74 - 99 mg/dL    BUN 6 (L) 7.0 - 18 MG/DL    Creatinine 0.93 0.6 - 1.3 MG/DL    BUN/Creatinine ratio 6 (L) 12 - 20      eGFR >60 >60 ml/min/1.73m2    Calcium 8.6 8.5 - 10.1 MG/DL         Signed By: Myesha Payan, MD     December 24, 2022

## 2022-12-24 NOTE — PROGRESS NOTES
Problem: Falls - Risk of  Goal: *Absence of Falls  Description: Document Sofia Fothergill Fall Risk and appropriate interventions in the flowsheet.   Outcome: Progressing Towards Goal  Note: Fall Risk Interventions:  Mobility Interventions: Bed/chair exit alarm, Patient to call before getting OOB         Medication Interventions: Teach patient to arise slowly

## 2022-12-24 NOTE — PROGRESS NOTES
[facility] [address] [address]  [phone]  Colon and Rectal Surgery Progress Note      Patient: Anh Dominguez MRN: [id number]  SSN: [id number]    YOB: 1960  Age: 65 y.o. Sex: male      Admit Date: 12/20/2022    LOS: 4 days     Subjective:     States he had bm last night. Tolerating fulls. Objective:     Vitals:    12/23/22 2211 12/24/22 0154 12/24/22 0445 12/24/22 0824   BP: (!) 139/92 (!) 146/93 (!) 144/82 121/79   Pulse: (!) 109 (!) 109 99 (!) 108   Resp: 20 20 20 20   Temp: 98.6 °F (37 °C) 97.8 °F (36.6 °C) 98.2 °F (36.8 °C) 98.6 °F (37 °C)   SpO2: 100% 97% 96% 94%   Weight:       Height:            Intake and Output:  Current Shift: No intake/output data recorded. Last three shifts: No intake/output data recorded. Physical Exam:     Constitutional: well developed, in NAD  Abdomen: Softer, nontender mildly distened    Lab/Data Review:    Patient refusing    Assessment:     Small bowel obstruction, partial    Plan:      Will advance to solids for dinner  Home tomorrow if tolerates    Signed By: Beth Rose MD        December 24, 2022

## 2022-12-25 VITALS
HEART RATE: 109 BPM | RESPIRATION RATE: 19 BRPM | BODY MASS INDEX: 18.65 KG/M2 | OXYGEN SATURATION: 100 % | TEMPERATURE: 98.4 F | WEIGHT: 150 LBS | HEIGHT: 75 IN | SYSTOLIC BLOOD PRESSURE: 122 MMHG | DIASTOLIC BLOOD PRESSURE: 80 MMHG

## 2022-12-25 LAB
ANION GAP SERPL CALC-SCNC: 4 MMOL/L (ref 3–18)
BUN SERPL-MCNC: 4 MG/DL (ref 7–18)
BUN/CREAT SERPL: 4 (ref 12–20)
CALCIUM SERPL-MCNC: 9 MG/DL (ref 8.5–10.1)
CHLORIDE SERPL-SCNC: 106 MMOL/L (ref 100–111)
CO2 SERPL-SCNC: 28 MMOL/L (ref 21–32)
CREAT SERPL-MCNC: 1.12 MG/DL (ref 0.6–1.3)
GLUCOSE SERPL-MCNC: 123 MG/DL (ref 74–99)
POTASSIUM SERPL-SCNC: 3.9 MMOL/L (ref 3.5–5.5)
SODIUM SERPL-SCNC: 138 MMOL/L (ref 136–145)

## 2022-12-25 PROCEDURE — 74011250636 HC RX REV CODE- 250/636: Performed by: HOSPITALIST

## 2022-12-25 PROCEDURE — 74011250637 HC RX REV CODE- 250/637: Performed by: HOSPITALIST

## 2022-12-25 PROCEDURE — 99232 SBSQ HOSP IP/OBS MODERATE 35: CPT | Performed by: COLON & RECTAL SURGERY

## 2022-12-25 PROCEDURE — 99239 HOSP IP/OBS DSCHRG MGMT >30: CPT | Performed by: STUDENT IN AN ORGANIZED HEALTH CARE EDUCATION/TRAINING PROGRAM

## 2022-12-25 PROCEDURE — 80048 BASIC METABOLIC PNL TOTAL CA: CPT

## 2022-12-25 PROCEDURE — 36415 COLL VENOUS BLD VENIPUNCTURE: CPT

## 2022-12-25 PROCEDURE — 74011250637 HC RX REV CODE- 250/637: Performed by: FAMILY MEDICINE

## 2022-12-25 RX ORDER — POLYETHYLENE GLYCOL 3350 17 G/17G
17 POWDER, FOR SOLUTION ORAL DAILY
Status: DISCONTINUED | OUTPATIENT
Start: 2022-12-25 | End: 2022-12-25 | Stop reason: SDUPTHER

## 2022-12-25 RX ADMIN — ENOXAPARIN SODIUM 40 MG: 100 INJECTION SUBCUTANEOUS at 13:27

## 2022-12-25 RX ADMIN — LITHIUM CARBONATE 150 MG: 150 CAPSULE, GELATIN COATED ORAL at 13:12

## 2022-12-25 RX ADMIN — DOCUSATE SODIUM 100 MG: 100 CAPSULE, LIQUID FILLED ORAL at 13:12

## 2022-12-25 NOTE — DISCHARGE INSTRUCTIONS
DISCHARGE SUMMARY from Nurse    PATIENT INSTRUCTIONS:    After general anesthesia or intravenous sedation, for 24 hours or while taking prescription Narcotics:  Limit your activities  Do not drive and operate hazardous machinery  Do not make important personal or business decisions  Do  not drink alcoholic beverages  If you have not urinated within 8 hours after discharge, please contact your surgeon on call. Report the following to your surgeon:  Excessive pain, swelling, redness or odor of or around the surgical area  Temperature over 100.5  Nausea and vomiting lasting longer than 4 hours or if unable to take medications  Any signs of decreased circulation or nerve impairment to extremity: change in color, persistent  numbness, tingling, coldness or increase pain  Any questions    What to do at Home:  Recommended activity: Activity as tolerated,     If you experience any of the following symptoms chest pain, shortness of breath, nausea, vomiting,or fever, please follow up with your primary care provider. *  Please give a list of your current medications to your Primary Care Provider. *  Please update this list whenever your medications are discontinued, doses are      changed, or new medications (including over-the-counter products) are added. *  Please carry medication information at all times in case of emergency situations. These are general instructions for a healthy lifestyle:    No smoking/ No tobacco products/ Avoid exposure to second hand smoke  Surgeon General's Warning:  Quitting smoking now greatly reduces serious risk to your health.     Obesity, smoking, and sedentary lifestyle greatly increases your risk for illness    A healthy diet, regular physical exercise & weight monitoring are important for maintaining a healthy lifestyle    You may be retaining fluid if you have a history of heart failure or if you experience any of the following symptoms:  Weight gain of 3 pounds or more overnight or 5 pounds in a week, increased swelling in our hands or feet or shortness of breath while lying flat in bed. Please call your doctor as soon as you notice any of these symptoms; do not wait until your next office visit. Patient armband removed and shredded. The discharge information has been reviewed with the patient. The patient verbalized understanding. Discharge medications reviewed with the patient and appropriate educational materials and side effects teaching were provided.   ___________________________________________________________________________________________________________________________________

## 2022-12-25 NOTE — DISCHARGE SUMMARY
Discharge Summary    Patient: Tera Alva MRN: [id number]  CSN: [id number]    YOB: 1960  Age: 65 y.o. Sex: male    DOA: 12/20/2022 LOS:  LOS: 5 days   Discharge Date:      Admission Diagnosis: Small bowel obstruction [facility]) [U02.977]    Discharge Diagnosis:    Hospital Problems  Date Reviewed: 9/26/2022            Codes Class Noted POA    Small bowel obstruction ([facility] [address] ICD-10-CM: W92.899  ICD-9-CM: 560.9  12/20/2022 Unknown           Discharge Condition: Stable  Discharge Disposition:     PHYSICAL EXAM  Visit Vitals  /78   Pulse (!) 106   Temp 98.6 °F (37 °C)   Resp 19   Ht 6' 3\" (1.905 m)   Wt 68 kg (150 lb)   SpO2 100%   BMI 18.75 kg/m²       General: Alert, cooperative, no acute distress    HEENT: NC, Atraumatic. PERRLA, EOMI. Anicteric sclerae. Lungs:  CTA Bilaterally. No Wheezing/Rhonchi/Rales. Heart:  Regular  rhythm,  No murmur, No Rubs, No Gallops  Abdomen: Soft, mildly distended. Extremities: No c/c/e  Psych:   Good insight. Not anxious or agitated. Neurologic:  CN 2-12 grossly intact, oriented X 3. No acute neurological                                 Deficits,     Hospital Course By Problem:   Partial small bowel obstruction: With a transition point in the right lower quadrant. Patient initially NPO. Started on IV pain medications, IV nausea medications. NG tube recommended by surgery. Patient refused. Patient began having bowel movements on 12/23. Diet advanced to full on 12/24 with good tolerance. Schizophrenia: Continued on Seroquel at bedtime, Atarax as needed, Zyprexa at bedtime, lithium 150 mg 3 times daily. GERD: On IV Protonix daily initially     BPH: Patient had a TURP procedure done in summer. We will continue Flomax 0.4 mg at bedtime. Consults:   Surgery - Dr. Deana Renner     Significant Diagnostic Studies:   CT Abdomen and pelvis:      view:   Dilated small bowel loops. Paucity of gas distally. Lung bases: Clear. No effusions. Heart and pericardium: Normal.      Liver:  Normal. There is a cyst near the dome posteriorly measuring 13 mm. Gallbladder/biliary: No calcified stones or wall thickening. Spleen: Normal.      Pancreas: Normal enhancement. No duct dilatation. Adrenals: Normal.      Kidneys:  Normally enhancing. No hydronephrosis or nephrolithiasis. Retroperitoneum: Great vessels unremarkable. Lymph nodes: No adenopathy upper abdomen, mesentery, retroperitoneum, pelvis,  groins. Bowel: Stomach and duodenum are unremarkable. Proximal small bowel is collapsed. Mid to distal small bowel loops are dilated measuring up to 4.5 cm. No wall  thickening or pneumatosis. No mesenteric gas. Abrupt change in caliber of the  distal ileum in the right lower quadrant. Discrete cause is not definitively  seen. Appendix normal. The colon is nearly completely empty, containing small  quantities of stool and gas. Peritoneal space: Trace free fluid anteriorly in the low abdomen     :  Prostate and urinary bladder are unremarkable. Abdominal wall/MSK: No hernias. No acute abnormalities at skeleton. IMPRESSION     High-grade small bowel obstruction with transition at right lower quadrant. Cause is not clearly seen. -Trace free fluid. Discharge Medications:     Current Discharge Medication List        CONTINUE these medications which have NOT CHANGED    Details   tamsulosin (FLOMAX) 0.4 mg capsule Take 1 Capsule by mouth daily (after dinner). Qty: 90 Capsule, Refills: 3      cyproheptadine (PERIACTIN) 4 mg tablet       divalproex DR (DEPAKOTE) 500 mg tablet       Therems-M 9 mg iron-400 mcg tab tablet       QUEtiapine (SEROquel) 100 mg tablet       traZODone (DESYREL) 150 mg tablet Take 150 mg by mouth nightly. omeprazole (PRILOSEC) 40 mg capsule Take 40 mg by mouth daily. OLANZapine (ZyPREXA) 20 mg tablet Take 20 mg by mouth nightly.       hydrOXYzine HCL (ATARAX) 50 mg tablet Take 50 mg by mouth three (3) times daily as needed. docusate sodium (COLACE) 100 mg capsule Take 100 mg by mouth two (2) times a day. terbinafine HCL (LAMISIL) 1 % topical cream Apply  to affected area two (2) times a day. ondansetron (Zofran ODT) 4 mg disintegrating tablet 1 Tab by SubLINGual route every eight (8) hours as needed for Nausea or Vomiting. Qty: 20 Tab, Refills: 0      lithium carbonate (LITHOBID) 150 mg capsule Take  by mouth three (3) times daily.            STOP taking these medications       dicyclomine (BENTYL) 10 mg capsule Comments:   Reason for Stopping:         levoFLOXacin (LEVAQUIN) 750 mg tablet Comments:   Reason for Stopping:              Activity: activity as tolerated    Diet: Regular Diet - low fiber     Wound Care: None needed    Follow-up: with PCP, Charo Montes MD in 7-10days    Minutes spent on discharge: >30 minutes spent coordinating this discharge (review instructions/follow-up, prescriptions, preparing report for sign off)

## 2022-12-25 NOTE — PROGRESS NOTES
Problem: Falls - Risk of  Goal: *Absence of Falls  Description: Document Tamiko Morales Fall Risk and appropriate interventions in the flowsheet.   Outcome: Progressing Towards Goal  Note: Fall Risk Interventions:  Mobility Interventions: Bed/chair exit alarm, Patient to call before getting OOB         Medication Interventions: Teach patient to arise slowly

## 2022-12-25 NOTE — PROGRESS NOTES
[facility] [address] [address]  [phone]  Colon and Rectal Surgery Progress Note      Patient: Faith Mosqueda MRN: [id number]  SSN: [id number]    YOB: 1960  Age: 65 y.o. Sex: male      Admit Date: 12/20/2022    LOS: 5 days     Subjective: Tolerating solids, passing flatus but no BM. Objective:     Vitals:    12/24/22 1140 12/24/22 1514 12/24/22 2000 12/25/22 0809   BP: 124/74 120/82 139/79 115/78   Pulse: (!) 103 (!) 102 100 (!) 106   Resp: 20 20 20 19   Temp: 98.9 °F (37.2 °C) 98.1 °F (36.7 °C) 98.7 °F (37.1 °C) 98.6 °F (37 °C)   SpO2: 99% 99% 98% 100%   Weight:       Height:            Intake and Output:  Current Shift: No intake/output data recorded. Last three shifts: No intake/output data recorded.     Physical Exam:     Constitutional: well developed, in NAD  Abdomen: Softer, nontender mildly distened    Lab/Data Review:    Patient refusing    Assessment:     Small bowel obstruction, partial    Plan:     Continue solids today  Discharge home if tolerates  Follow-up in my office as needed    Signed By: Anaya Clark MD        December 25, 2022

## 2022-12-29 NOTE — PROGRESS NOTES
Physician Progress Note      PATIENT:               Smith Elise  CSN #:                  [id number]  :                       1960  ADMIT DATE:       2022 4:02 PM  [address] DATE:        2022 3:13 PM  RESPONDING  PROVIDER #:        Haley Vega DO          QUERY TEXT:    Dear Dr Amanda Zapien  Patient admitted with Partial  SBO. Pt noted to have BPH and was treated with Flomax. ?If possible, please document in progress notes and discharge summary if you are evaluating and/or treating any of the following: The medical record reflects the following:  Risk Factors:  pt with h/o prostate cancer  Clinical Indicators: per  H&P  and DS - BPH: Patient had a TURP procedure done in summer. Treatment:   continue Flomax 0.4 mg at bedtime. Thank you,   Damien Rogers RN   CCDS  Options provided:  -- BPH with partial/complete urinary obstruction  -- BPH with urinary retention without obstruction  -- BPH  without   LUTS  -- Other - I will add my own diagnosis  -- Disagree - Not applicable / Not valid  -- Disagree - Clinically unable to determine / Unknown  -- Refer to Clinical Documentation Reviewer    PROVIDER RESPONSE TEXT:    This patient has BPH with?urinary retention without obstruction.     Query created by: Malick Marrero on 2022 9:36 AM      Electronically signed by:  Haley Vega DO 2022 1:17 PM

## 2024-06-12 PROBLEM — E66.3 OVERWEIGHT WITH BODY MASS INDEX (BMI) 25.0-29.9: Status: ACTIVE | Noted: 2024-06-12

## 2024-06-12 PROBLEM — M25.50 PAIN IN JOINT: Status: ACTIVE | Noted: 2024-06-12

## 2024-06-12 PROBLEM — N39.0 URINARY TRACT INFECTIOUS DISEASE: Status: ACTIVE | Noted: 2024-06-12

## 2024-06-12 PROBLEM — B35.3 TINEA PEDIS: Status: ACTIVE | Noted: 2024-06-12

## 2024-06-12 PROBLEM — F20.9 SCHIZOPHRENIA (HCC): Status: ACTIVE | Noted: 2024-06-12

## 2024-06-12 PROBLEM — R10.33 PERIUMBILICAL PAIN: Status: ACTIVE | Noted: 2024-06-12

## 2024-06-12 PROBLEM — R41.82 ALTERED MENTAL STATUS: Status: ACTIVE | Noted: 2024-06-12

## 2024-06-12 PROBLEM — C61 MALIGNANT NEOPLASM OF PROSTATE (HCC): Status: ACTIVE | Noted: 2023-01-12

## 2024-06-12 PROBLEM — R97.20 ELEVATED PSA: Status: ACTIVE | Noted: 2024-06-12

## 2024-06-12 PROBLEM — R63.0 ANOREXIA: Status: ACTIVE | Noted: 2024-06-12

## 2024-06-12 PROBLEM — L84 CALLOSITY: Status: ACTIVE | Noted: 2024-06-12

## 2024-06-12 PROBLEM — M25.551 RIGHT HIP PAIN: Status: ACTIVE | Noted: 2024-06-12

## 2024-06-12 PROBLEM — R63.4 ABNORMAL WEIGHT LOSS: Status: ACTIVE | Noted: 2024-06-12

## 2024-06-12 PROBLEM — R05.9 COUGH: Status: ACTIVE | Noted: 2024-06-12

## 2024-06-12 PROBLEM — R35.0 FREQUENCY OF MICTURITION: Status: ACTIVE | Noted: 2024-06-12

## 2024-06-12 PROBLEM — F31.89: Status: ACTIVE | Noted: 2024-06-12

## 2024-06-12 PROBLEM — R06.2 WHEEZING: Status: ACTIVE | Noted: 2024-06-12

## 2024-06-12 PROBLEM — K59.00 CONSTIPATION: Status: ACTIVE | Noted: 2024-06-12

## 2024-06-12 PROBLEM — R06.02 SHORTNESS OF BREATH: Status: ACTIVE | Noted: 2024-06-12

## 2024-06-12 PROBLEM — N40.0 BENIGN PROSTATIC HYPERPLASIA WITHOUT URINARY OBSTRUCTION: Status: ACTIVE | Noted: 2024-06-12

## 2024-06-12 PROBLEM — F25.9 SCHIZOAFFECTIVE DISORDER (HCC): Status: ACTIVE | Noted: 2024-06-12

## 2024-06-12 PROBLEM — J40 BRONCHITIS: Status: ACTIVE | Noted: 2024-06-12

## 2024-06-12 PROBLEM — K30 FUNCTIONAL DYSPEPSIA: Status: ACTIVE | Noted: 2024-06-12

## 2024-06-12 PROBLEM — F06.2 PSYCHOTIC DISORDER WITH DELUSIONS DUE TO KNOWN PHYSIOLOGICAL CONDITION: Status: ACTIVE | Noted: 2024-06-12

## 2024-06-12 PROBLEM — G47.00 INSOMNIA: Status: ACTIVE | Noted: 2024-06-12

## 2024-07-12 PROBLEM — R05.9 COUGH: Status: RESOLVED | Noted: 2024-06-12 | Resolved: 2024-07-12

## 2024-09-29 ENCOUNTER — HOSPITAL ENCOUNTER (EMERGENCY)
Facility: HOSPITAL | Age: 64
Discharge: HOME | End: 2024-09-29
Payer: MEDICARE

## 2024-09-29 ENCOUNTER — APPOINTMENT (OUTPATIENT)
Facility: HOSPITAL | Age: 64
End: 2024-09-29
Payer: MEDICARE

## 2024-09-29 VITALS
HEART RATE: 95 BPM | DIASTOLIC BLOOD PRESSURE: 82 MMHG | TEMPERATURE: 97.7 F | HEIGHT: 75 IN | WEIGHT: 136.6 LBS | SYSTOLIC BLOOD PRESSURE: 128 MMHG | OXYGEN SATURATION: 100 % | BODY MASS INDEX: 16.98 KG/M2 | RESPIRATION RATE: 16 BRPM

## 2024-09-29 DIAGNOSIS — R10.84 GENERALIZED ABDOMINAL PAIN: Primary | ICD-10-CM

## 2024-09-29 DIAGNOSIS — R63.0 DECREASED APPETITE: ICD-10-CM

## 2024-09-29 LAB
ALBUMIN SERPL-MCNC: 2.9 G/DL (ref 3.4–5)
ALBUMIN/GLOB SERPL: 0.7 (ref 0.8–1.7)
ALP SERPL-CCNC: 67 U/L (ref 45–117)
ALT SERPL-CCNC: 36 U/L (ref 16–61)
ANION GAP SERPL CALC-SCNC: 3 MMOL/L (ref 3–18)
APPEARANCE UR: CLEAR
AST SERPL-CCNC: 34 U/L (ref 10–38)
BASOPHILS # BLD: 0 K/UL (ref 0–0.1)
BASOPHILS NFR BLD: 0 % (ref 0–2)
BILIRUB SERPL-MCNC: 0.2 MG/DL (ref 0.2–1)
BILIRUB UR QL: NEGATIVE
BUN SERPL-MCNC: 10 MG/DL (ref 7–18)
BUN/CREAT SERPL: 9 (ref 12–20)
CALCIUM SERPL-MCNC: 8.2 MG/DL (ref 8.5–10.1)
CHLORIDE SERPL-SCNC: 103 MMOL/L (ref 100–111)
CO2 SERPL-SCNC: 30 MMOL/L (ref 21–32)
COLOR UR: YELLOW
CREAT SERPL-MCNC: 1.09 MG/DL (ref 0.6–1.3)
DIFFERENTIAL METHOD BLD: ABNORMAL
EKG ATRIAL RATE: 105 BPM
EKG DIAGNOSIS: NORMAL
EKG P AXIS: 82 DEGREES
EKG P-R INTERVAL: 158 MS
EKG Q-T INTERVAL: 378 MS
EKG QRS DURATION: 136 MS
EKG QTC CALCULATION (BAZETT): 499 MS
EKG R AXIS: 41 DEGREES
EKG T AXIS: 65 DEGREES
EKG VENTRICULAR RATE: 105 BPM
EOSINOPHIL # BLD: 0.1 K/UL (ref 0–0.4)
EOSINOPHIL NFR BLD: 2 % (ref 0–5)
ERYTHROCYTE [DISTWIDTH] IN BLOOD BY AUTOMATED COUNT: 13.8 % (ref 11.6–14.5)
GLOBULIN SER CALC-MCNC: 3.9 G/DL (ref 2–4)
GLUCOSE SERPL-MCNC: 108 MG/DL (ref 74–99)
GLUCOSE UR STRIP.AUTO-MCNC: NEGATIVE MG/DL
HCT VFR BLD AUTO: 38.1 % (ref 36–48)
HGB BLD-MCNC: 13.3 G/DL (ref 13–16)
HGB UR QL STRIP: NEGATIVE
IMM GRANULOCYTES # BLD AUTO: 0 K/UL (ref 0–0.04)
IMM GRANULOCYTES NFR BLD AUTO: 1 % (ref 0–0.5)
KETONES UR QL STRIP.AUTO: NEGATIVE MG/DL
LEUKOCYTE ESTERASE UR QL STRIP.AUTO: NEGATIVE
LYMPHOCYTES # BLD: 1 K/UL (ref 0.9–3.6)
LYMPHOCYTES NFR BLD: 20 % (ref 21–52)
MAGNESIUM SERPL-MCNC: 1.6 MG/DL (ref 1.6–2.6)
MCH RBC QN AUTO: 34.9 PG (ref 24–34)
MCHC RBC AUTO-ENTMCNC: 34.9 G/DL (ref 31–37)
MCV RBC AUTO: 100 FL (ref 78–100)
MONOCYTES # BLD: 0.9 K/UL (ref 0.05–1.2)
MONOCYTES NFR BLD: 17 % (ref 3–10)
NEUTS SEG # BLD: 3.1 K/UL (ref 1.8–8)
NEUTS SEG NFR BLD: 60 % (ref 40–73)
NITRITE UR QL STRIP.AUTO: NEGATIVE
NRBC # BLD: 0 K/UL (ref 0–0.01)
NRBC BLD-RTO: 0 PER 100 WBC
PH UR STRIP: 6.5 (ref 5–8)
PHOSPHATE SERPL-MCNC: 3.8 MG/DL (ref 2.5–4.9)
PLATELET # BLD AUTO: 193 K/UL (ref 135–420)
PMV BLD AUTO: 11 FL (ref 9.2–11.8)
POTASSIUM SERPL-SCNC: 3.9 MMOL/L (ref 3.5–5.5)
PROT SERPL-MCNC: 6.8 G/DL (ref 6.4–8.2)
PROT UR STRIP-MCNC: NEGATIVE MG/DL
RBC # BLD AUTO: 3.81 M/UL (ref 4.35–5.65)
SODIUM SERPL-SCNC: 136 MMOL/L (ref 136–145)
SP GR UR REFRACTOMETRY: 1.01 (ref 1–1.03)
UROBILINOGEN UR QL STRIP.AUTO: 1 EU/DL (ref 0.2–1)
WBC # BLD AUTO: 5.2 K/UL (ref 4.6–13.2)

## 2024-09-29 PROCEDURE — 74177 CT ABD & PELVIS W/CONTRAST: CPT

## 2024-09-29 PROCEDURE — 81003 URINALYSIS AUTO W/O SCOPE: CPT

## 2024-09-29 PROCEDURE — 80053 COMPREHEN METABOLIC PANEL: CPT

## 2024-09-29 PROCEDURE — 83735 ASSAY OF MAGNESIUM: CPT

## 2024-09-29 PROCEDURE — 85025 COMPLETE CBC W/AUTO DIFF WBC: CPT

## 2024-09-29 PROCEDURE — 93005 ELECTROCARDIOGRAM TRACING: CPT

## 2024-09-29 PROCEDURE — 99285 EMERGENCY DEPT VISIT HI MDM: CPT

## 2024-09-29 PROCEDURE — 84100 ASSAY OF PHOSPHORUS: CPT

## 2024-09-29 PROCEDURE — 93010 ELECTROCARDIOGRAM REPORT: CPT | Performed by: INTERNAL MEDICINE

## 2024-09-29 PROCEDURE — 6360000004 HC RX CONTRAST MEDICATION

## 2024-09-29 RX ORDER — IOPAMIDOL 612 MG/ML
100 INJECTION, SOLUTION INTRAVASCULAR
Status: COMPLETED | OUTPATIENT
Start: 2024-09-29 | End: 2024-09-29

## 2024-09-29 RX ADMIN — IOPAMIDOL 90 ML: 612 INJECTION, SOLUTION INTRAVENOUS at 14:03

## 2024-09-29 ASSESSMENT — PAIN DESCRIPTION - LOCATION: LOCATION: ABDOMEN

## 2024-09-29 ASSESSMENT — PAIN SCALES - GENERAL: PAINLEVEL_OUTOF10: 8

## 2024-09-29 ASSESSMENT — PAIN - FUNCTIONAL ASSESSMENT: PAIN_FUNCTIONAL_ASSESSMENT: 0-10

## 2024-09-29 NOTE — ED PROVIDER NOTES
syntax, homophones, and other interpretive errors are inadvertently transcribed by the computer software.  Please disregard these errors.  Please excuse any errors that have escaped final proofreading.       Maureen Sanders PA-C  09/29/24 [id number]

## 2024-09-29 NOTE — ED TRIAGE NOTES
Pt presents to triage for anorexia, weight loss, and abdominal pain. Symptoms have been going on for 1 year. Pt able to drink fluids but unable to eat.

## 2025-02-14 ENCOUNTER — TRANSCRIBE ORDERS (OUTPATIENT)
Facility: HOSPITAL | Age: 65
End: 2025-02-14

## 2025-02-14 DIAGNOSIS — R13.12 DYSPHAGIA, OROPHARYNGEAL: ICD-10-CM

## 2025-02-14 DIAGNOSIS — R63.4 ABNORMAL WEIGHT LOSS: Primary | ICD-10-CM

## 2025-02-21 ENCOUNTER — HOSPITAL ENCOUNTER (OUTPATIENT)
Facility: HOSPITAL | Age: 65
Discharge: HOME | End: 2025-02-24
Payer: MEDICARE

## 2025-02-21 DIAGNOSIS — R63.4 ABNORMAL WEIGHT LOSS: ICD-10-CM

## 2025-02-21 DIAGNOSIS — R13.12 DYSPHAGIA, OROPHARYNGEAL: ICD-10-CM

## 2025-02-21 PROCEDURE — 74220 X-RAY XM ESOPHAGUS 1CNTRST: CPT

## 2025-02-21 PROCEDURE — 2500000003 HC RX 250 WO HCPCS: Performed by: FAMILY MEDICINE

## 2025-02-21 RX ADMIN — BARIUM SULFATE 30 ML: 960 POWDER, FOR SUSPENSION ORAL at 11:20

## 2025-03-17 ENCOUNTER — HOSPITAL ENCOUNTER (OUTPATIENT)
Facility: HOSPITAL | Age: 65
Discharge: HOME | End: 2025-03-20
Payer: MEDICARE

## 2025-03-17 DIAGNOSIS — R13.10 PROBLEMS WITH SWALLOWING AND MASTICATION: ICD-10-CM

## 2025-03-17 DIAGNOSIS — R63.30 FEEDING DIFFICULTY: ICD-10-CM

## 2025-03-17 PROCEDURE — 92611 MOTION FLUOROSCOPY/SWALLOW: CPT

## 2025-03-17 PROCEDURE — 74230 X-RAY XM SWLNG FUNCJ C+: CPT

## 2025-03-17 NOTE — PROGRESS NOTES
[facility]  SPEECH LANGUAGE PATHOLOGY OUTPATIENT MODIFIED BARIUM SWALLOW STUDY    Patient: Wesley Cleveland III (64 y.o. male)  Date: 3/17/2025  Primary Diagnosis: Feeding difficulty [R63.30]  Problems with swallowing and mastication [R13.10]   * Day of Surgery *   Precautions: Aspiration    Assessment:  Based on the objective data described below, the patient presents with severe pharyngeal dysphagia c/b silent penetration to laryngeal vestibule with  honey-thick and puree trials via tsp. Mod-sev pharyngeal residuals resulted in further silent airway comp events. Small/effortful bites/sips with chin tuck ineffective at improving airway protection. Deficits include decreased base of tongue retraction, weak laryngeal elevation/excursion, and impaired pharyngeal motility/sensation. Rec NPO with PEG, aspiration precautions, and oral care TID. Rec skilled SLP services to address above stated deficits with repeat MBS x 6-8 weeks of therapy. Results/recommendations d/w pt and pt's family with video feedback utilization. All questions answered to the best of this clinician's ability.     Recommendations:   NPO with PEG  Aspiration precautions  SLP services to address above stated deficits  Repeat MBS post 6-8 weeks of SLP tx to reassess swallow fxn     SUBJECTIVE:   Patient stated “Can't you pull that stuff out?” - referring to Ba in pharyngeal area on video feedback    OBJECTIVE:     Past Medical History:   Diagnosis Date    Prostate cancer (HCC)     Psychiatric disorder     Schizophrenia      Past Surgical History:   Procedure Laterality Date    UROLOGICAL SURGERY  06/02/2022     TRANSRECTAL ULTRASOUND GUIDED BIOPSY OF THE PROSTATE; Dr. Lauren     Prior Level of Swallowing Function/Home Situation: Pt with PEG in place and is at [facility] for oropharyngeal swallow re-assessment.  Diet prior to admission: NPO with PEG  Current Diet:  NPO with PEG   Orientation:Person, Place, Time, and Situation  Type of

## 2025-08-13 ENCOUNTER — HOSPITAL ENCOUNTER (EMERGENCY)
Facility: HOSPITAL | Age: 65
Discharge: HOME | End: 2025-08-13
Attending: STUDENT IN AN ORGANIZED HEALTH CARE EDUCATION/TRAINING PROGRAM
Payer: MEDICARE

## 2025-08-13 VITALS
HEART RATE: 91 BPM | DIASTOLIC BLOOD PRESSURE: 103 MMHG | SYSTOLIC BLOOD PRESSURE: 126 MMHG | RESPIRATION RATE: 16 BRPM | HEIGHT: 75 IN | OXYGEN SATURATION: 99 % | TEMPERATURE: 97.7 F | BODY MASS INDEX: 17.03 KG/M2 | WEIGHT: 137 LBS

## 2025-08-13 DIAGNOSIS — K94.20 COMPLICATION OF GASTROSTOMY TUBE (HCC): Primary | ICD-10-CM

## 2025-08-13 PROCEDURE — 99282 EMERGENCY DEPT VISIT SF MDM: CPT

## 2025-08-13 ASSESSMENT — PAIN - FUNCTIONAL ASSESSMENT: PAIN_FUNCTIONAL_ASSESSMENT: 0-10

## 2025-08-13 ASSESSMENT — PAIN SCALES - GENERAL: PAINLEVEL_OUTOF10: 0
